# Patient Record
Sex: FEMALE | Race: ASIAN | NOT HISPANIC OR LATINO | Employment: FULL TIME | URBAN - METROPOLITAN AREA
[De-identification: names, ages, dates, MRNs, and addresses within clinical notes are randomized per-mention and may not be internally consistent; named-entity substitution may affect disease eponyms.]

---

## 2017-09-06 ENCOUNTER — TELEPHONE (OUTPATIENT)
Dept: OTOLARYNGOLOGY | Facility: CLINIC | Age: 27
End: 2017-09-06

## 2017-09-06 NOTE — TELEPHONE ENCOUNTER
Attempted call to pt advising unable to keep appt as our providers do not accept her insurance/age. Will attempt another call tomorrow.

## 2023-11-08 ENCOUNTER — OFFICE VISIT (OUTPATIENT)
Dept: DERMATOLOGY | Facility: CLINIC | Age: 33
End: 2023-11-08
Payer: COMMERCIAL

## 2023-11-08 ENCOUNTER — LAB VISIT (OUTPATIENT)
Dept: LAB | Facility: HOSPITAL | Age: 33
End: 2023-11-08
Attending: STUDENT IN AN ORGANIZED HEALTH CARE EDUCATION/TRAINING PROGRAM
Payer: COMMERCIAL

## 2023-11-08 VITALS — WEIGHT: 167.13 LBS | HEIGHT: 59 IN | BODY MASS INDEX: 33.69 KG/M2

## 2023-11-08 DIAGNOSIS — L40.9 PSORIASIS: Primary | ICD-10-CM

## 2023-11-08 DIAGNOSIS — L91.8 INFLAMED SKIN TAG: ICD-10-CM

## 2023-11-08 DIAGNOSIS — L40.9 PSORIASIS: ICD-10-CM

## 2023-11-08 LAB
BASOPHILS # BLD AUTO: 0.05 K/UL (ref 0–0.2)
BASOPHILS NFR BLD: 0.7 % (ref 0–1.9)
DIFFERENTIAL METHOD: NORMAL
EOSINOPHIL # BLD AUTO: 0 K/UL (ref 0–0.5)
EOSINOPHIL NFR BLD: 0.5 % (ref 0–8)
ERYTHROCYTE [DISTWIDTH] IN BLOOD BY AUTOMATED COUNT: 11.8 % (ref 11.5–14.5)
HCT VFR BLD AUTO: 45.5 % (ref 37–48.5)
HGB BLD-MCNC: 14.9 G/DL (ref 12–16)
IMM GRANULOCYTES # BLD AUTO: 0.04 K/UL (ref 0–0.04)
IMM GRANULOCYTES NFR BLD AUTO: 0.5 % (ref 0–0.5)
LYMPHOCYTES # BLD AUTO: 1.7 K/UL (ref 1–4.8)
LYMPHOCYTES NFR BLD: 22.1 % (ref 18–48)
MCH RBC QN AUTO: 29.5 PG (ref 27–31)
MCHC RBC AUTO-ENTMCNC: 32.7 G/DL (ref 32–36)
MCV RBC AUTO: 90 FL (ref 82–98)
MONOCYTES # BLD AUTO: 0.3 K/UL (ref 0.3–1)
MONOCYTES NFR BLD: 4.2 % (ref 4–15)
NEUTROPHILS # BLD AUTO: 5.4 K/UL (ref 1.8–7.7)
NEUTROPHILS NFR BLD: 72 % (ref 38–73)
NRBC BLD-RTO: 0 /100 WBC
PLATELET # BLD AUTO: 309 K/UL (ref 150–450)
PMV BLD AUTO: 10.7 FL (ref 9.2–12.9)
RBC # BLD AUTO: 5.05 M/UL (ref 4–5.4)
WBC # BLD AUTO: 7.55 K/UL (ref 3.9–12.7)

## 2023-11-08 PROCEDURE — 3008F BODY MASS INDEX DOCD: CPT | Mod: CPTII,S$GLB,, | Performed by: STUDENT IN AN ORGANIZED HEALTH CARE EDUCATION/TRAINING PROGRAM

## 2023-11-08 PROCEDURE — 17110 DESTRUCTION B9 LES UP TO 14: CPT | Mod: S$GLB,,, | Performed by: STUDENT IN AN ORGANIZED HEALTH CARE EDUCATION/TRAINING PROGRAM

## 2023-11-08 PROCEDURE — 1159F MED LIST DOCD IN RCRD: CPT | Mod: CPTII,S$GLB,, | Performed by: STUDENT IN AN ORGANIZED HEALTH CARE EDUCATION/TRAINING PROGRAM

## 2023-11-08 PROCEDURE — 99999 PR PBB SHADOW E&M-EST. PATIENT-LVL III: ICD-10-PCS | Mod: PBBFAC,,, | Performed by: STUDENT IN AN ORGANIZED HEALTH CARE EDUCATION/TRAINING PROGRAM

## 2023-11-08 PROCEDURE — 86480 TB TEST CELL IMMUN MEASURE: CPT | Performed by: STUDENT IN AN ORGANIZED HEALTH CARE EDUCATION/TRAINING PROGRAM

## 2023-11-08 PROCEDURE — 1160F RVW MEDS BY RX/DR IN RCRD: CPT | Mod: CPTII,S$GLB,, | Performed by: STUDENT IN AN ORGANIZED HEALTH CARE EDUCATION/TRAINING PROGRAM

## 2023-11-08 PROCEDURE — 36415 COLL VENOUS BLD VENIPUNCTURE: CPT | Performed by: STUDENT IN AN ORGANIZED HEALTH CARE EDUCATION/TRAINING PROGRAM

## 2023-11-08 PROCEDURE — 17110 PR DESTRUCTION BENIGN LESIONS UP TO 14: ICD-10-PCS | Mod: S$GLB,,, | Performed by: STUDENT IN AN ORGANIZED HEALTH CARE EDUCATION/TRAINING PROGRAM

## 2023-11-08 PROCEDURE — 1159F PR MEDICATION LIST DOCUMENTED IN MEDICAL RECORD: ICD-10-PCS | Mod: CPTII,S$GLB,, | Performed by: STUDENT IN AN ORGANIZED HEALTH CARE EDUCATION/TRAINING PROGRAM

## 2023-11-08 PROCEDURE — 85025 COMPLETE CBC W/AUTO DIFF WBC: CPT | Performed by: STUDENT IN AN ORGANIZED HEALTH CARE EDUCATION/TRAINING PROGRAM

## 2023-11-08 PROCEDURE — 99204 OFFICE O/P NEW MOD 45 MIN: CPT | Mod: 25,S$GLB,, | Performed by: STUDENT IN AN ORGANIZED HEALTH CARE EDUCATION/TRAINING PROGRAM

## 2023-11-08 PROCEDURE — 80074 ACUTE HEPATITIS PANEL: CPT | Performed by: STUDENT IN AN ORGANIZED HEALTH CARE EDUCATION/TRAINING PROGRAM

## 2023-11-08 PROCEDURE — 99204 PR OFFICE/OUTPT VISIT, NEW, LEVL IV, 45-59 MIN: ICD-10-PCS | Mod: 25,S$GLB,, | Performed by: STUDENT IN AN ORGANIZED HEALTH CARE EDUCATION/TRAINING PROGRAM

## 2023-11-08 PROCEDURE — 80053 COMPREHEN METABOLIC PANEL: CPT | Performed by: STUDENT IN AN ORGANIZED HEALTH CARE EDUCATION/TRAINING PROGRAM

## 2023-11-08 PROCEDURE — 3008F PR BODY MASS INDEX (BMI) DOCUMENTED: ICD-10-PCS | Mod: CPTII,S$GLB,, | Performed by: STUDENT IN AN ORGANIZED HEALTH CARE EDUCATION/TRAINING PROGRAM

## 2023-11-08 PROCEDURE — 1160F PR REVIEW ALL MEDS BY PRESCRIBER/CLIN PHARMACIST DOCUMENTED: ICD-10-PCS | Mod: CPTII,S$GLB,, | Performed by: STUDENT IN AN ORGANIZED HEALTH CARE EDUCATION/TRAINING PROGRAM

## 2023-11-08 PROCEDURE — 99999 PR PBB SHADOW E&M-EST. PATIENT-LVL III: CPT | Mod: PBBFAC,,, | Performed by: STUDENT IN AN ORGANIZED HEALTH CARE EDUCATION/TRAINING PROGRAM

## 2023-11-08 RX ORDER — RISANKIZUMAB-RZAA 150 MG/ML
INJECTION SUBCUTANEOUS
Qty: 1 ML | Refills: 6 | Status: ACTIVE | OUTPATIENT
Start: 2023-11-08 | End: 2024-01-11

## 2023-11-08 RX ORDER — CLOBETASOL PROPIONATE 0.46 MG/ML
SOLUTION TOPICAL 2 TIMES DAILY
Qty: 50 ML | Refills: 3 | Status: SHIPPED | OUTPATIENT
Start: 2023-11-08 | End: 2024-02-15

## 2023-11-08 RX ORDER — MOMETASONE FUROATE 1 MG/G
OINTMENT TOPICAL DAILY
Qty: 45 G | Refills: 3 | Status: SHIPPED | OUTPATIENT
Start: 2023-11-08

## 2023-11-08 NOTE — PROGRESS NOTES
Subjective:       Patient ID:  Santiago Landaverde is a 33 y.o. female who presents for   Chief Complaint   Patient presents with    Dry Skin     Pt visit skin rash  and tags to be removed      History of Present Illness: The patient presents with chief complaint of a persistent and worsening rash.  Location: throughout the scalp, some on the face and now developing on the trunk and extremities.   Duration: ongoing for years  Signs/Symptoms: thick, red plaques with thick white flaking, itching and irritation  Prior treatments: has been using Tgel shampoo and topical steroids with a little improvement, but symptoms are persistent.     Patient with new complaint of lesion(s) - painful skin tags  Location: 2 located on the neck  Duration: several months, recently started to become painful  Symptoms: hanging tags rubbing against clothing  Relieving factors/Previous treatments: none          Dry Skin        Review of Systems   Skin:  Positive for dry skin.        Objective:    Physical Exam   Constitutional: She appears well-developed and well-nourished. No distress.   Neurological: She is alert and oriented to person, place, and time. She is not disoriented.   Psychiatric: She has a normal mood and affect.   Skin:   Areas Examined (abnormalities noted in diagram):   Scalp / Hair Palpated and Inspected  Head / Face Inspection Performed  Neck Inspection Performed  Chest / Axilla Inspection Performed  Abdomen Inspection Performed  Back Inspection Performed  RUE Inspected  LUE Inspection Performed  RLE Inspected  LLE Inspection Performed                   Diagram Legend     Erythematous scaling macule/papule c/w actinic keratosis       Vascular papule c/w angioma      Pigmented verrucoid papule/plaque c/w seborrheic keratosis      Yellow umbilicated papule c/w sebaceous hyperplasia      Irregularly shaped tan macule c/w lentigo     1-2 mm smooth white papules consistent with Milia      Movable subcutaneous cyst with punctum  c/w epidermal inclusion cyst      Subcutaneous movable cyst c/w pilar cyst      Firm pink to brown papule c/w dermatofibroma      Pedunculated fleshy papule(s) c/w skin tag(s)      Evenly pigmented macule c/w junctional nevus     Mildly variegated pigmented, slightly irregular-bordered macule c/w mildly atypical nevus      Flesh colored to evenly pigmented papule c/w intradermal nevus       Pink pearly papule/plaque c/w basal cell carcinoma      Erythematous hyperkeratotic cursted plaque c/w SCC      Surgical scar with no sign of skin cancer recurrence      Open and closed comedones      Inflammatory papules and pustules      Verrucoid papule consistent consistent with wart     Erythematous eczematous patches and plaques     Dystrophic onycholytic nail with subungual debris c/w onychomycosis     Umbilicated papule    Erythematous-base heme-crusted tan verrucoid plaque consistent with inflamed seborrheic keratosis     Erythematous Silvery Scaling Plaque c/w Psoriasis     See annotation      Assessment / Plan:          Psoriasis - BSA >15%, including involvement on the scalp. Would be a good candidate for biologic treatment. After discussion with patient, will proceed with Skyrizi. Labs today.   -     Comprehensive Metabolic Panel; Future; Expected date: 11/08/2023  -     CBC Auto Differential; Future; Expected date: 11/08/2023  -     HEPATITIS PANEL, ACUTE; Future; Expected date: 11/08/2023  -     Quantiferon Gold TB; Future; Expected date: 11/08/2023  -     clobetasoL (TEMOVATE) 0.05 % external solution; Apply topically 2 (two) times daily.  Dispense: 50 mL; Refill: 3  -     mometasone (ELOCON) 0.1 % ointment; Apply topically once daily.  Dispense: 45 g; Refill: 3    Inflamed skin tag  Cryosurgery procedure note:    Verbal consent from the patient is obtained including, but not limited to, risk of hypopigmentation/hyperpigmentation, scar, recurrence of lesion. Liquid nitrogen cryosurgery is applied to 2 lesions to  produce a freeze injury. The patient is aware that blisters may form and is instructed on wound care with gentle cleansing and use of vaseline ointment to keep moist until healed. The patient is supplied a handout on cryosurgery and is instructed to call if lesions do not completely resolve.           Follow up in about 6 months (around 5/8/2024).

## 2023-11-09 PROBLEM — L40.9 PSORIASIS: Status: ACTIVE | Noted: 2023-11-08

## 2023-11-09 LAB
ALBUMIN SERPL BCP-MCNC: 4.7 G/DL (ref 3.5–5.2)
ALP SERPL-CCNC: 90 U/L (ref 55–135)
ALT SERPL W/O P-5'-P-CCNC: 131 U/L (ref 10–44)
ANION GAP SERPL CALC-SCNC: 10 MMOL/L (ref 8–16)
AST SERPL-CCNC: 71 U/L (ref 10–40)
BILIRUB SERPL-MCNC: 0.5 MG/DL (ref 0.1–1)
BUN SERPL-MCNC: 10 MG/DL (ref 6–20)
CALCIUM SERPL-MCNC: 10.5 MG/DL (ref 8.7–10.5)
CHLORIDE SERPL-SCNC: 101 MMOL/L (ref 95–110)
CO2 SERPL-SCNC: 27 MMOL/L (ref 23–29)
CREAT SERPL-MCNC: 1 MG/DL (ref 0.5–1.4)
EST. GFR  (NO RACE VARIABLE): >60 ML/MIN/1.73 M^2
GLUCOSE SERPL-MCNC: 156 MG/DL (ref 70–110)
HAV IGM SERPL QL IA: NORMAL
HBV CORE IGM SERPL QL IA: NORMAL
HBV SURFACE AG SERPL QL IA: NORMAL
HCV AB SERPL QL IA: NORMAL
POTASSIUM SERPL-SCNC: 4.3 MMOL/L (ref 3.5–5.1)
PROT SERPL-MCNC: 9 G/DL (ref 6–8.4)
SODIUM SERPL-SCNC: 138 MMOL/L (ref 136–145)

## 2023-11-10 DIAGNOSIS — R79.89 ELEVATED LFTS: Primary | ICD-10-CM

## 2023-11-10 LAB
GAMMA INTERFERON BACKGROUND BLD IA-ACNC: 0.35 IU/ML
M TB IFN-G CD4+ BCKGRND COR BLD-ACNC: 0.02 IU/ML
M TB IFN-G CD4+ BCKGRND COR BLD-ACNC: 0.04 IU/ML
MITOGEN IGNF BCKGRD COR BLD-ACNC: 4.53 IU/ML
TB GOLD PLUS: NEGATIVE

## 2023-11-14 ENCOUNTER — TELEPHONE (OUTPATIENT)
Dept: DERMATOLOGY | Facility: CLINIC | Age: 33
End: 2023-11-14
Payer: COMMERCIAL

## 2023-11-14 NOTE — TELEPHONE ENCOUNTER
Called and notified the patient of her results and that referral was sent. Pt given number to check on the status of medication at [facility].  Looks like medication was approved. Pt notified I would check with provider to see if she needed to wait until she saw gi to start treatment. Pt verbalized understanding   ----- Message from Adiel Laughlin MD sent at 11/10/2023 11:01 AM CST -----  Please let the patient know I sent over a referral to our gastroenterology department to help give us manage her elevated liver enzymes before and while on treatment for her psoriasis. Referral was placed. All other labs were normal and TB test was negative.

## 2023-11-21 NOTE — PROGRESS NOTES
Yeah, her levels are very elevated, and want to get their okay. I'm not sure if they've reached out to her not, but the referral was placed over a week ago.

## 2023-11-22 ENCOUNTER — PATIENT MESSAGE (OUTPATIENT)
Dept: DERMATOLOGY | Facility: CLINIC | Age: 33
End: 2023-11-22
Payer: COMMERCIAL

## 2023-11-22 ENCOUNTER — TELEPHONE (OUTPATIENT)
Dept: DERMATOLOGY | Facility: CLINIC | Age: 33
End: 2023-11-22
Payer: COMMERCIAL

## 2023-11-22 DIAGNOSIS — R79.89 ELEVATED LFTS: Primary | ICD-10-CM

## 2023-11-22 NOTE — TELEPHONE ENCOUNTER
Attempted to contact patient to follow up that we need clearance from gi to proceed with med. No answer. Message left. Pt was also sent message in portal.   ----- Message from Adiel Laughlin MD sent at 11/21/2023  9:46 AM CST -----  Yeah, her levels are very elevated, and want to get their okay. I'm not sure if they've reached out to her not, but the referral was placed over a week ago.

## 2023-11-29 ENCOUNTER — OFFICE VISIT (OUTPATIENT)
Dept: GASTROENTEROLOGY | Facility: CLINIC | Age: 33
End: 2023-11-29
Payer: COMMERCIAL

## 2023-11-29 ENCOUNTER — LAB VISIT (OUTPATIENT)
Dept: LAB | Facility: HOSPITAL | Age: 33
End: 2023-11-29
Attending: NURSE PRACTITIONER
Payer: COMMERCIAL

## 2023-11-29 VITALS
HEIGHT: 59 IN | SYSTOLIC BLOOD PRESSURE: 135 MMHG | BODY MASS INDEX: 33.52 KG/M2 | WEIGHT: 166.25 LBS | HEART RATE: 98 BPM | DIASTOLIC BLOOD PRESSURE: 91 MMHG

## 2023-11-29 DIAGNOSIS — K76.0 FATTY LIVER: Primary | ICD-10-CM

## 2023-11-29 DIAGNOSIS — R79.89 ELEVATED LFTS: ICD-10-CM

## 2023-11-29 LAB
BASOPHILS # BLD AUTO: 0.05 K/UL (ref 0–0.2)
BASOPHILS NFR BLD: 0.8 % (ref 0–1.9)
DIFFERENTIAL METHOD: NORMAL
EOSINOPHIL # BLD AUTO: 0.1 K/UL (ref 0–0.5)
EOSINOPHIL NFR BLD: 0.8 % (ref 0–8)
ERYTHROCYTE [DISTWIDTH] IN BLOOD BY AUTOMATED COUNT: 11.8 % (ref 11.5–14.5)
HCT VFR BLD AUTO: 43.5 % (ref 37–48.5)
HGB BLD-MCNC: 14.3 G/DL (ref 12–16)
IMM GRANULOCYTES # BLD AUTO: 0.03 K/UL (ref 0–0.04)
IMM GRANULOCYTES NFR BLD AUTO: 0.5 % (ref 0–0.5)
INR PPP: 1 (ref 0.8–1.2)
LYMPHOCYTES # BLD AUTO: 1.6 K/UL (ref 1–4.8)
LYMPHOCYTES NFR BLD: 26.2 % (ref 18–48)
MCH RBC QN AUTO: 29.6 PG (ref 27–31)
MCHC RBC AUTO-ENTMCNC: 32.9 G/DL (ref 32–36)
MCV RBC AUTO: 90 FL (ref 82–98)
MONOCYTES # BLD AUTO: 0.4 K/UL (ref 0.3–1)
MONOCYTES NFR BLD: 6.4 % (ref 4–15)
NEUTROPHILS # BLD AUTO: 4.1 K/UL (ref 1.8–7.7)
NEUTROPHILS NFR BLD: 65.3 % (ref 38–73)
NRBC BLD-RTO: 0 /100 WBC
PLATELET # BLD AUTO: 263 K/UL (ref 150–450)
PMV BLD AUTO: 10.5 FL (ref 9.2–12.9)
PROTHROMBIN TIME: 11.2 SEC (ref 9–12.5)
RBC # BLD AUTO: 4.83 M/UL (ref 4–5.4)
WBC # BLD AUTO: 6.21 K/UL (ref 3.9–12.7)

## 2023-11-29 PROCEDURE — 3080F DIAST BP >= 90 MM HG: CPT | Mod: CPTII,S$GLB,, | Performed by: NURSE PRACTITIONER

## 2023-11-29 PROCEDURE — 3080F PR MOST RECENT DIASTOLIC BLOOD PRESSURE >= 90 MM HG: ICD-10-PCS | Mod: CPTII,S$GLB,, | Performed by: NURSE PRACTITIONER

## 2023-11-29 PROCEDURE — 82103 ALPHA-1-ANTITRYPSIN TOTAL: CPT | Performed by: NURSE PRACTITIONER

## 2023-11-29 PROCEDURE — 99999 PR PBB SHADOW E&M-EST. PATIENT-LVL IV: ICD-10-PCS | Mod: PBBFAC,,, | Performed by: NURSE PRACTITIONER

## 2023-11-29 PROCEDURE — 84466 ASSAY OF TRANSFERRIN: CPT | Performed by: NURSE PRACTITIONER

## 2023-11-29 PROCEDURE — 83540 ASSAY OF IRON: CPT | Performed by: NURSE PRACTITIONER

## 2023-11-29 PROCEDURE — 82728 ASSAY OF FERRITIN: CPT | Performed by: NURSE PRACTITIONER

## 2023-11-29 PROCEDURE — 86039 ANTINUCLEAR ANTIBODIES (ANA): CPT | Performed by: NURSE PRACTITIONER

## 2023-11-29 PROCEDURE — 3075F SYST BP GE 130 - 139MM HG: CPT | Mod: CPTII,S$GLB,, | Performed by: NURSE PRACTITIONER

## 2023-11-29 PROCEDURE — 86235 NUCLEAR ANTIGEN ANTIBODY: CPT | Mod: 59 | Performed by: NURSE PRACTITIONER

## 2023-11-29 PROCEDURE — 82784 ASSAY IGA/IGD/IGG/IGM EACH: CPT | Mod: 59 | Performed by: NURSE PRACTITIONER

## 2023-11-29 PROCEDURE — 85610 PROTHROMBIN TIME: CPT | Performed by: NURSE PRACTITIONER

## 2023-11-29 PROCEDURE — 99204 OFFICE O/P NEW MOD 45 MIN: CPT | Mod: S$GLB,,, | Performed by: NURSE PRACTITIONER

## 2023-11-29 PROCEDURE — 85025 COMPLETE CBC W/AUTO DIFF WBC: CPT | Performed by: NURSE PRACTITIONER

## 2023-11-29 PROCEDURE — 3075F PR MOST RECENT SYSTOLIC BLOOD PRESS GE 130-139MM HG: ICD-10-PCS | Mod: CPTII,S$GLB,, | Performed by: NURSE PRACTITIONER

## 2023-11-29 PROCEDURE — 3008F PR BODY MASS INDEX (BMI) DOCUMENTED: ICD-10-PCS | Mod: CPTII,S$GLB,, | Performed by: NURSE PRACTITIONER

## 2023-11-29 PROCEDURE — 86704 HEP B CORE ANTIBODY TOTAL: CPT | Performed by: NURSE PRACTITIONER

## 2023-11-29 PROCEDURE — 86015 ACTIN ANTIBODY EACH: CPT | Performed by: NURSE PRACTITIONER

## 2023-11-29 PROCEDURE — 99204 PR OFFICE/OUTPT VISIT, NEW, LEVL IV, 45-59 MIN: ICD-10-PCS | Mod: S$GLB,,, | Performed by: NURSE PRACTITIONER

## 2023-11-29 PROCEDURE — 86790 VIRUS ANTIBODY NOS: CPT | Performed by: NURSE PRACTITIONER

## 2023-11-29 PROCEDURE — 80053 COMPREHEN METABOLIC PANEL: CPT | Performed by: NURSE PRACTITIONER

## 2023-11-29 PROCEDURE — 86038 ANTINUCLEAR ANTIBODIES: CPT | Performed by: NURSE PRACTITIONER

## 2023-11-29 PROCEDURE — 1160F PR REVIEW ALL MEDS BY PRESCRIBER/CLIN PHARMACIST DOCUMENTED: ICD-10-PCS | Mod: CPTII,S$GLB,, | Performed by: NURSE PRACTITIONER

## 2023-11-29 PROCEDURE — 36415 COLL VENOUS BLD VENIPUNCTURE: CPT | Performed by: NURSE PRACTITIONER

## 2023-11-29 PROCEDURE — 86364 TISS TRNSGLTMNASE EA IG CLAS: CPT | Performed by: NURSE PRACTITIONER

## 2023-11-29 PROCEDURE — 99999 PR PBB SHADOW E&M-EST. PATIENT-LVL IV: CPT | Mod: PBBFAC,,, | Performed by: NURSE PRACTITIONER

## 2023-11-29 PROCEDURE — 3008F BODY MASS INDEX DOCD: CPT | Mod: CPTII,S$GLB,, | Performed by: NURSE PRACTITIONER

## 2023-11-29 PROCEDURE — 1160F RVW MEDS BY RX/DR IN RCRD: CPT | Mod: CPTII,S$GLB,, | Performed by: NURSE PRACTITIONER

## 2023-11-29 PROCEDURE — 1159F MED LIST DOCD IN RCRD: CPT | Mod: CPTII,S$GLB,, | Performed by: NURSE PRACTITIONER

## 2023-11-29 PROCEDURE — 1159F PR MEDICATION LIST DOCUMENTED IN MEDICAL RECORD: ICD-10-PCS | Mod: CPTII,S$GLB,, | Performed by: NURSE PRACTITIONER

## 2023-11-29 PROCEDURE — 86381 MITOCHONDRIAL ANTIBODY EACH: CPT | Performed by: NURSE PRACTITIONER

## 2023-11-29 NOTE — PROGRESS NOTES
Clinic Consult:  [facility] Consultation Note    Reason for Consult:  The primary encounter diagnosis was Fatty liver. A diagnosis of Elevated LFTs was also pertinent to this visit.    PCP: Susie Hopson   [address] [facility] / [place] [id number]    HPI:  This is a 33 y.o. female here for evaluation of elevated liver enzymes.   She has psoriasis and is seeing dr. Laughlin who would like to start Skyrizi. She got baseline labs and noticed liver enzymes to be elevated.   Reviewed previous labs and was also elevated in February and May 2023.   She also had an ultrasound in 2017 that showed fatty liver.   Risk factors for fatty liver: DM, obesity, PSOS with metabolic syndrome.   She does not drink alcohol but did in 2020 and 2021. She would drink about 4-5 days out of the week and would drink about 2 shots per day.   She did start Januvia and Ozempic about 3 months ago no other recent medication changes. No family history of liver disease.     Her PCP did order an abdominal ultrasound. I will fax the result to her  Susie Mark Anthony  [id number]   [place] [address]  [phone]    Review of Systems   Constitutional:  Negative for fever and weight loss.   HENT:  Negative for sore throat.    Respiratory:  Negative for cough, shortness of breath and wheezing.    Cardiovascular:  Negative for chest pain and palpitations.   Gastrointestinal:  Negative for abdominal pain, blood in stool, constipation, diarrhea, heartburn, melena, nausea and vomiting.   Genitourinary:  Negative for dysuria and frequency.   Skin:  Negative for itching and rash.       Medical History:  has a past medical history of Hyperlipidemia and Insulin resistance.    Surgical History:  has no past surgical history on file.    Family History: family history is not on file..     Social History:  reports that she has never smoked. She has never used smokeless tobacco. She reports that she does not drink  "alcohol and does not use drugs.    Allergies: Reviewed    Home Medications:   Current Outpatient Medications on File Prior to Visit   Medication Sig Dispense Refill    clobetasoL (TEMOVATE) 0.05 % external solution Apply topically 2 (two) times daily. 50 mL 3    JANUVIA 50 mg Tab Take 50 mg by mouth every morning.      medroxyPROGESTERone (PROVERA) 10 MG tablet Take 1 tablet (10 mg total) by mouth once daily. Take 1 pill po daily x 10 days each month for 3 months. for 10 days 10 tablet 2    mometasone (ELOCON) 0.1 % ointment Apply topically once daily. 45 g 3    OZEMPIC 0.25 mg or 0.5 mg (2 mg/3 mL) pen injector INJECT 0.25 MG SUBCUTANEOUSLY EVERY WEEK      VYVANSE 10 mg Cap take 1 capsule by mouth every morning maximum daily dose of 1 capsule      risankizumab-rzaa (SKYRIZI) 150 mg/mL PnIj Inject 150 mg at weeks 0, 4, and then every 12 weeks thereafter. (Patient not taking: Reported on 11/29/2023) 1 mL 6     No current facility-administered medications on file prior to visit.       Physical Exam:  BP (!) 135/91 (BP Location: Left arm, Patient Position: Sitting, BP Method: Medium (Automatic))   Pulse 98   Ht 4' 11" (1.499 m)   Wt 75.4 kg (166 lb 3.6 oz)   BMI 33.57 kg/m²   Body mass index is 33.57 kg/m².  Physical Exam  Constitutional:       General: She is not in acute distress.  Cardiovascular:      Rate and Rhythm: Normal rate and regular rhythm.      Heart sounds: Normal heart sounds. No murmur heard.  Pulmonary:      Effort: Pulmonary effort is normal. No respiratory distress.      Breath sounds: Normal breath sounds.   Neurological:      Mental Status: She is alert.   Psychiatric:         Mood and Affect: Mood normal.         Behavior: Behavior normal.         Labs: Pertinent labs reviewed.  CRC Screening: NA    Assessment:  1. Fatty liver    2. Elevated LFTs    We discussed this is likely fatty liver disease. This would not be contraindication for Skyrizi. However, will get full workup piror to giving " clearance to start.     Recommendations:   - ultrasound  - labs below.   - fibroscan     Fatty liver  -     FibroScan (Vibration Controlled Transient Elastography); Future    Elevated LFTs  -     Ambulatory referral/consult to Gastroenterology  -     US Abdomen Limited; Future; Expected date: 11/29/2023  -     Alpha-1-Antitrypsin; Future; Expected date: 11/29/2023  -     BRAXTON Screen w/Reflex; Future; Expected date: 11/29/2023  -     Antimitochondrial Antibody; Future; Expected date: 11/29/2023  -     Anti-Smooth Muscle Antibody; Future; Expected date: 11/29/2023  -     CBC Auto Differential; Future; Expected date: 11/29/2023  -     Comprehensive Metabolic Panel; Future; Expected date: 11/29/2023  -     Ferritin; Future; Expected date: 11/29/2023  -     Hepatitis A antibody, IgG; Future; Expected date: 11/29/2023  -     Iron and TIBC; Future; Expected date: 11/29/2023  -     Protime-INR; Future; Expected date: 11/29/2023  -     Tissue Transglutaminase, IgA; Future; Expected date: 11/29/2023  -     Immunoglobulins (IgG, IgA, IgM) Quantitative; Future; Expected date: 11/29/2023  -     Hepatitis B Core Antibody, Total; Future; Expected date: 11/29/2023  -     FibroScan (Vibration Controlled Transient Elastography); Future    Follow up to be determined after results/ procedure(s).    Thank you so much for allowing me to participate in the care of Santiago FloresEVAN Martinez

## 2023-11-30 ENCOUNTER — PATIENT MESSAGE (OUTPATIENT)
Dept: GASTROENTEROLOGY | Facility: CLINIC | Age: 33
End: 2023-11-30
Payer: COMMERCIAL

## 2023-11-30 LAB
A1AT SERPL-MCNC: 109 MG/DL (ref 100–190)
ALBUMIN SERPL BCP-MCNC: 4.2 G/DL (ref 3.5–5.2)
ALP SERPL-CCNC: 82 U/L (ref 55–135)
ALT SERPL W/O P-5'-P-CCNC: 101 U/L (ref 10–44)
ANA PATTERN 1: NORMAL
ANA SER QL IF: POSITIVE
ANA TITR SER IF: NORMAL {TITER}
ANION GAP SERPL CALC-SCNC: 14 MMOL/L (ref 8–16)
AST SERPL-CCNC: 47 U/L (ref 10–40)
BILIRUB SERPL-MCNC: 0.4 MG/DL (ref 0.1–1)
BUN SERPL-MCNC: 13 MG/DL (ref 6–20)
CALCIUM SERPL-MCNC: 9.7 MG/DL (ref 8.7–10.5)
CHLORIDE SERPL-SCNC: 100 MMOL/L (ref 95–110)
CO2 SERPL-SCNC: 23 MMOL/L (ref 23–29)
CREAT SERPL-MCNC: 1.1 MG/DL (ref 0.5–1.4)
EST. GFR  (NO RACE VARIABLE): >60 ML/MIN/1.73 M^2
FERRITIN SERPL-MCNC: 315 NG/ML (ref 20–300)
GLUCOSE SERPL-MCNC: 302 MG/DL (ref 70–110)
HAV IGG SER QL IA: NORMAL
HBV CORE AB SERPL QL IA: NORMAL
IGA SERPL-MCNC: 600 MG/DL (ref 40–350)
IGG SERPL-MCNC: 1383 MG/DL (ref 650–1600)
IGM SERPL-MCNC: 107 MG/DL (ref 50–300)
IRON SERPL-MCNC: 69 UG/DL (ref 30–160)
POTASSIUM SERPL-SCNC: 4.5 MMOL/L (ref 3.5–5.1)
PROT SERPL-MCNC: 8.2 G/DL (ref 6–8.4)
SATURATED IRON: 16 % (ref 20–50)
SODIUM SERPL-SCNC: 137 MMOL/L (ref 136–145)
TOTAL IRON BINDING CAPACITY: 444 UG/DL (ref 250–450)
TRANSFERRIN SERPL-MCNC: 300 MG/DL (ref 200–375)

## 2023-12-01 ENCOUNTER — PATIENT MESSAGE (OUTPATIENT)
Dept: GASTROENTEROLOGY | Facility: CLINIC | Age: 33
End: 2023-12-01
Payer: COMMERCIAL

## 2023-12-01 LAB
MITOCHONDRIA AB TITR SER IF: NORMAL {TITER}
SMOOTH MUSCLE AB TITR SER IF: NORMAL {TITER}

## 2023-12-04 LAB — TTG IGA SER-ACNC: 1.5 U/ML

## 2023-12-07 LAB
ANTI SM ANTIBODY: 0.14 RATIO (ref 0–0.99)
ANTI SM/RNP ANTIBODY: 0.52 RATIO (ref 0–0.99)
ANTI-SM INTERPRETATION: NEGATIVE
ANTI-SM/RNP INTERPRETATION: NEGATIVE
ANTI-SSA ANTIBODY: 0.07 RATIO (ref 0–0.99)
ANTI-SSA INTERPRETATION: NEGATIVE
ANTI-SSB ANTIBODY: 0.09 RATIO (ref 0–0.99)
ANTI-SSB INTERPRETATION: NEGATIVE
DSDNA AB SER-ACNC: NORMAL [IU]/ML

## 2023-12-19 ENCOUNTER — HOSPITAL ENCOUNTER (OUTPATIENT)
Dept: RADIOLOGY | Facility: HOSPITAL | Age: 33
Discharge: HOME | End: 2023-12-19
Attending: NURSE PRACTITIONER
Payer: COMMERCIAL

## 2023-12-19 ENCOUNTER — PROCEDURE VISIT (OUTPATIENT)
Dept: HEPATOLOGY | Facility: CLINIC | Age: 33
End: 2023-12-19
Payer: COMMERCIAL

## 2023-12-19 VITALS — HEIGHT: 59 IN | WEIGHT: 165.38 LBS | BODY MASS INDEX: 33.34 KG/M2

## 2023-12-19 DIAGNOSIS — R79.89 ELEVATED LFTS: ICD-10-CM

## 2023-12-19 DIAGNOSIS — K76.0 FATTY LIVER: ICD-10-CM

## 2023-12-19 PROCEDURE — 76705 US ABDOMEN LIMITED: ICD-10-PCS | Mod: 26,,, | Performed by: RADIOLOGY

## 2023-12-19 PROCEDURE — 76705 ECHO EXAM OF ABDOMEN: CPT | Mod: TC

## 2023-12-19 PROCEDURE — 76981 USE PARENCHYMA: CPT | Mod: S$GLB,,, | Performed by: NURSE PRACTITIONER

## 2023-12-19 PROCEDURE — 76705 ECHO EXAM OF ABDOMEN: CPT | Mod: 26,,, | Performed by: RADIOLOGY

## 2023-12-19 PROCEDURE — 76981 FIBROSCAN (VIBRATION CONTROLLED TRANSIENT ELASTOGRAPHY): ICD-10-PCS | Mod: S$GLB,,, | Performed by: NURSE PRACTITIONER

## 2023-12-19 NOTE — PROCEDURES
FibroScan (Vibration Controlled Transient Elastography)    Date/Time: 12/19/2023 2:00 PM    Performed by: Ольга Bliss RN  Authorized by: Doris Lam NP    Probe:     Universal Protocol: Patient's identity, procedure and site were verified, confirmatory pause was performed.  Discussed procedure including risks and potential complications.  Questions answered.  Patient verbalizes understanding and wishes to proceed with VCTE.     Procedure: After providing explanations of the procedure, patient was placed in the supine position with right arm in maximum abduction to allow optimal exposure of right lateral abdomen.  Patient was briefly assessed, Testing was performed in the mid-axillary location, 50Hz Shear Wave pulses were applied and the resulting Shear Wave and Propagation Speed detected with a 3.5 MHz ultrasonic signal, using the FibroScan probe, Skin to liver capsule distance and liver parenchyma were accessed during the entire examination with the FibroScan probe, Patient was instructed to breathe normally and to abstain from sudden movements during the procedure, allowing for random measurements of liver stiffness. At least 10 Shear Waves were produced, Individual measurements of each Shear Wave were calculated.  Patient tolerated the procedure well with no complications.  Meets discharge criteria as was dismissed.  Rates pain 0 out of 10.  Patient will follow up with ordering provider to review results.

## 2023-12-20 ENCOUNTER — PATIENT MESSAGE (OUTPATIENT)
Dept: HEPATOLOGY | Facility: CLINIC | Age: 33
End: 2023-12-20
Payer: COMMERCIAL

## 2023-12-20 ENCOUNTER — TELEPHONE (OUTPATIENT)
Dept: HEPATOLOGY | Facility: CLINIC | Age: 33
End: 2023-12-20
Payer: COMMERCIAL

## 2023-12-20 NOTE — PROCEDURES
Fibroscan Procedure     Name: Santiago Landaverde  Date of Procedure : 2023   :: TAMARA Daniel  Diagnosis: NAFLD    Probe: M    Fibroscan readin.2 KPa    Fibrosis:F3     CAP readin dB/m    Steatosis: :S3       Interpretation:   Severe steatosis with advanced fibrosis

## 2023-12-20 NOTE — TELEPHONE ENCOUNTER
----- Message from Doris Lam NP sent at 12/20/2023  7:34 AM CST -----  Please contact patient to schedule consultation with hepatology. I would like her to be seen within the next couple of months if possible. Reason is liver fibrosis, elevated liver enzymes, positive BRAXTON.

## 2024-01-04 ENCOUNTER — PATIENT MESSAGE (OUTPATIENT)
Dept: DERMATOLOGY | Facility: CLINIC | Age: 34
End: 2024-01-04
Payer: COMMERCIAL

## 2024-01-05 NOTE — TELEPHONE ENCOUNTER
I will double check for her. Based on her liver scan results, want to make sure things are okay to start

## 2024-01-09 ENCOUNTER — PATIENT MESSAGE (OUTPATIENT)
Dept: DERMATOLOGY | Facility: CLINIC | Age: 34
End: 2024-01-09
Payer: COMMERCIAL

## 2024-01-10 NOTE — PROGRESS NOTES
Subjective     Patient ID: Santiago Landaverde is a 33 y.o. female.    Chief Complaint: Establish Care      HPI  Patient presents to establish care. She reports she was recently diagnosed as diabetic but does not remember her A1c. She has been taking Januvia and Ozempic    Review of Systems   Constitutional:  Negative for chills and fatigue.   Respiratory:  Negative for chest tightness and shortness of breath.    Cardiovascular:  Negative for chest pain and palpitations.   Gastrointestinal:  Negative for abdominal pain, constipation, diarrhea, nausea and vomiting.   Genitourinary:  Negative for frequency and urgency.   Neurological:  Negative for dizziness, numbness and headaches.          Objective       Current Outpatient Medications:     clobetasoL (TEMOVATE) 0.05 % external solution, Apply topically 2 (two) times daily., Disp: 50 mL, Rfl: 3    medroxyPROGESTERone (PROVERA) 10 MG tablet, Take 1 tablet (10 mg total) by mouth once daily. Take 1 pill po daily x 10 days each month for 3 months. for 10 days, Disp: 10 tablet, Rfl: 2    mometasone (ELOCON) 0.1 % ointment, Apply topically once daily., Disp: 45 g, Rfl: 3    OZEMPIC 0.25 mg or 0.5 mg (2 mg/3 mL) pen injector, INJECT 0.25 MG SUBCUTANEOUSLY EVERY WEEK, Disp: , Rfl:     VYVANSE 10 mg Cap, take 1 capsule by mouth every morning maximum daily dose of 1 capsule, Disp: , Rfl:     JANUVIA 50 mg Tab, Take 1 tablet (50 mg total) by mouth every morning., Disp: 90 tablet, Rfl: 3     Physical Exam  Constitutional:       General: She is not in acute distress.     Appearance: Normal appearance. She is obese.   HENT:      Head: Normocephalic and atraumatic.   Cardiovascular:      Rate and Rhythm: Normal rate and regular rhythm.      Heart sounds: Normal heart sounds. No murmur heard.     No friction rub. No gallop.   Pulmonary:      Effort: Pulmonary effort is normal.      Breath sounds: Normal breath sounds. No wheezing, rhonchi or rales.   Abdominal:      General: Bowel  sounds are normal. There is no distension.      Palpations: Abdomen is soft.      Tenderness: There is no abdominal tenderness. There is no rebound.   Skin:     General: Skin is warm and dry.      Coloration: Skin is not jaundiced.   Neurological:      General: No focal deficit present.      Mental Status: She is alert and oriented to person, place, and time. Mental status is at baseline.   Psychiatric:         Mood and Affect: Mood normal.         Behavior: Behavior normal.            Assessment and Plan     1. Prediabetes  -     Hemoglobin A1C; Future; Expected date: 01/11/2024    2. Type 2 diabetes mellitus without complication, without long-term current use of insulin  -     JANUVIA 50 mg Tab; Take 1 tablet (50 mg total) by mouth every morning.  Dispense: 90 tablet; Refill: 3        Will obtain A1c.  Will refill Januvia today. Will refill/adjust Ozempic after A1c results       Follow up in about 3 months (around 4/11/2024) for Diabetes follow up.

## 2024-01-11 ENCOUNTER — OFFICE VISIT (OUTPATIENT)
Dept: FAMILY MEDICINE | Facility: CLINIC | Age: 34
End: 2024-01-11
Payer: COMMERCIAL

## 2024-01-11 ENCOUNTER — LAB VISIT (OUTPATIENT)
Dept: LAB | Facility: HOSPITAL | Age: 34
End: 2024-01-11
Attending: INTERNAL MEDICINE
Payer: COMMERCIAL

## 2024-01-11 VITALS
BODY MASS INDEX: 32.77 KG/M2 | OXYGEN SATURATION: 98 % | DIASTOLIC BLOOD PRESSURE: 84 MMHG | WEIGHT: 162.25 LBS | HEART RATE: 94 BPM | RESPIRATION RATE: 18 BRPM | TEMPERATURE: 98 F | SYSTOLIC BLOOD PRESSURE: 126 MMHG

## 2024-01-11 DIAGNOSIS — E11.9 TYPE 2 DIABETES MELLITUS WITHOUT COMPLICATION, WITHOUT LONG-TERM CURRENT USE OF INSULIN: ICD-10-CM

## 2024-01-11 DIAGNOSIS — R73.03 PREDIABETES: Primary | ICD-10-CM

## 2024-01-11 DIAGNOSIS — R73.03 PREDIABETES: ICD-10-CM

## 2024-01-11 PROCEDURE — 3008F BODY MASS INDEX DOCD: CPT | Mod: CPTII,S$GLB,, | Performed by: INTERNAL MEDICINE

## 2024-01-11 PROCEDURE — 99999 PR PBB SHADOW E&M-EST. PATIENT-LVL III: CPT | Mod: PBBFAC,,, | Performed by: INTERNAL MEDICINE

## 2024-01-11 PROCEDURE — 36415 COLL VENOUS BLD VENIPUNCTURE: CPT | Mod: PO | Performed by: INTERNAL MEDICINE

## 2024-01-11 PROCEDURE — 83036 HEMOGLOBIN GLYCOSYLATED A1C: CPT | Performed by: INTERNAL MEDICINE

## 2024-01-11 PROCEDURE — 3079F DIAST BP 80-89 MM HG: CPT | Mod: CPTII,S$GLB,, | Performed by: INTERNAL MEDICINE

## 2024-01-11 PROCEDURE — 99203 OFFICE O/P NEW LOW 30 MIN: CPT | Mod: S$GLB,,, | Performed by: INTERNAL MEDICINE

## 2024-01-11 PROCEDURE — 3074F SYST BP LT 130 MM HG: CPT | Mod: CPTII,S$GLB,, | Performed by: INTERNAL MEDICINE

## 2024-01-11 PROCEDURE — 1159F MED LIST DOCD IN RCRD: CPT | Mod: CPTII,S$GLB,, | Performed by: INTERNAL MEDICINE

## 2024-01-11 RX ORDER — SITAGLIPTIN 50 MG/1
50 TABLET, FILM COATED ORAL EVERY MORNING
Qty: 90 TABLET | Refills: 3 | Status: SHIPPED | OUTPATIENT
Start: 2024-01-11

## 2024-01-12 ENCOUNTER — PATIENT MESSAGE (OUTPATIENT)
Dept: FAMILY MEDICINE | Facility: CLINIC | Age: 34
End: 2024-01-12
Payer: COMMERCIAL

## 2024-01-12 LAB
ESTIMATED AVG GLUCOSE: 134 MG/DL (ref 68–131)
HBA1C MFR BLD: 6.3 % (ref 4–5.6)

## 2024-01-12 RX ORDER — SEMAGLUTIDE 0.68 MG/ML
INJECTION, SOLUTION SUBCUTANEOUS
Qty: 1 EACH | Refills: 11 | Status: SHIPPED | OUTPATIENT
Start: 2024-01-12

## 2024-01-21 ENCOUNTER — PATIENT MESSAGE (OUTPATIENT)
Dept: DERMATOLOGY | Facility: CLINIC | Age: 34
End: 2024-01-21
Payer: COMMERCIAL

## 2024-01-21 DIAGNOSIS — L40.9 PSORIASIS: Primary | ICD-10-CM

## 2024-01-23 NOTE — TELEPHONE ENCOUNTER
Thanks for the update. I see she has an appointment for 3/8/24. She should keep that for her follow-up.

## 2024-02-13 ENCOUNTER — PATIENT MESSAGE (OUTPATIENT)
Dept: FAMILY MEDICINE | Facility: CLINIC | Age: 34
End: 2024-02-13
Payer: COMMERCIAL

## 2024-02-14 ENCOUNTER — TELEPHONE (OUTPATIENT)
Dept: FAMILY MEDICINE | Facility: CLINIC | Age: 34
End: 2024-02-14
Payer: COMMERCIAL

## 2024-02-14 RX ORDER — RISANKIZUMAB-RZAA 150 MG/ML
INJECTION SUBCUTANEOUS
Qty: 1 ML | Refills: 3 | Status: SHIPPED | OUTPATIENT
Start: 2024-02-14

## 2024-02-14 NOTE — TELEPHONE ENCOUNTER
Pt last  Dx her with type 2 diabetes and was on Ozempic.  Now her Ozempic is being denied because we have her dx as prediabetic.  Pt want the Ozempic and the dx changed to type 2 diabetes.

## 2024-02-14 NOTE — TELEPHONE ENCOUNTER
----- Message from Gerri Coker sent at 2/14/2024 10:36 AM CST -----  Contact: Nancy Sloan is calling in regards to the Ozempic.please call back at .[phone]           Thanks  AMADOU

## 2024-02-15 ENCOUNTER — TELEPHONE (OUTPATIENT)
Dept: FAMILY MEDICINE | Facility: CLINIC | Age: 34
End: 2024-02-15
Payer: COMMERCIAL

## 2024-02-15 ENCOUNTER — OFFICE VISIT (OUTPATIENT)
Dept: HEPATOLOGY | Facility: CLINIC | Age: 34
End: 2024-02-15
Payer: COMMERCIAL

## 2024-02-15 ENCOUNTER — PATIENT MESSAGE (OUTPATIENT)
Dept: FAMILY MEDICINE | Facility: CLINIC | Age: 34
End: 2024-02-15
Payer: COMMERCIAL

## 2024-02-15 VITALS — BODY MASS INDEX: 32.25 KG/M2 | HEIGHT: 59 IN | WEIGHT: 160 LBS

## 2024-02-15 DIAGNOSIS — R79.89 ELEVATED LFTS: ICD-10-CM

## 2024-02-15 DIAGNOSIS — K74.60 CIRRHOSIS OF LIVER WITHOUT ASCITES, UNSPECIFIED HEPATIC CIRRHOSIS TYPE: Primary | ICD-10-CM

## 2024-02-15 DIAGNOSIS — K75.81 NASH (NONALCOHOLIC STEATOHEPATITIS): Primary | ICD-10-CM

## 2024-02-15 DIAGNOSIS — R73.03 PREDIABETES: ICD-10-CM

## 2024-02-15 PROCEDURE — 1160F RVW MEDS BY RX/DR IN RCRD: CPT | Mod: CPTII,95,, | Performed by: INTERNAL MEDICINE

## 2024-02-15 PROCEDURE — 99213 OFFICE O/P EST LOW 20 MIN: CPT | Mod: 95,,, | Performed by: INTERNAL MEDICINE

## 2024-02-15 PROCEDURE — 1159F MED LIST DOCD IN RCRD: CPT | Mod: CPTII,95,, | Performed by: INTERNAL MEDICINE

## 2024-02-15 PROCEDURE — 3008F BODY MASS INDEX DOCD: CPT | Mod: CPTII,95,, | Performed by: INTERNAL MEDICINE

## 2024-02-15 PROCEDURE — 3044F HG A1C LEVEL LT 7.0%: CPT | Mod: CPTII,95,, | Performed by: INTERNAL MEDICINE

## 2024-02-15 RX ORDER — IBUPROFEN 200 MG
200 TABLET ORAL EVERY 6 HOURS PRN
COMMUNITY

## 2024-02-15 NOTE — PROGRESS NOTES
Subjective:     Santiago Landaverde is here for follow up of Elevated Hepatic Enzymes    Patient is new to me.  Previously seen by my colleague.    HPI  Since Santiago Landaverde's last visit she denies any major issues.  She had been on Ozempic and was losing weight but it is no longer being covered by her insurance.  She does have issues with diabetes and insulin resistance along with PCOS.  Otherwise she has been feeling well.  She is here for follow up because her FibroScan showed F3 fibrosis and S3 steatosis.    Review of Systems    Objective:     Physical Exam    MELD 3.0: 8 at 11/29/2023  2:00 PM  MELD-Na: 7 at 11/29/2023  2:00 PM  Calculated from:  Serum Creatinine: 1.1 mg/dL at 11/29/2023  2:00 PM  Serum Sodium: 137 mmol/L at 11/29/2023  2:00 PM  Total Bilirubin: 0.4 mg/dL (Using min of 1 mg/dL) at 11/29/2023  2:00 PM  Serum Albumin: 4.2 g/dL (Using max of 3.5 g/dL) at 11/29/2023  2:00 PM  INR(ratio): 1.0 at 11/29/2023  2:00 PM  Age at listing (hypothetical): 33 years  Sex: Female at 11/29/2023  2:00 PM      WBC   Date Value Ref Range Status   11/29/2023 6.21 3.90 - 12.70 K/uL Final     Hemoglobin   Date Value Ref Range Status   11/29/2023 14.3 12.0 - 16.0 g/dL Final     Hematocrit   Date Value Ref Range Status   11/29/2023 43.5 37.0 - 48.5 % Final     Platelets   Date Value Ref Range Status   11/29/2023 263 150 - 450 K/uL Final     BUN   Date Value Ref Range Status   11/29/2023 13 6 - 20 mg/dL Final     Creatinine   Date Value Ref Range Status   11/29/2023 1.1 0.5 - 1.4 mg/dL Final     Glucose   Date Value Ref Range Status   11/29/2023 302 (H) 70 - 110 mg/dL Final     Calcium   Date Value Ref Range Status   11/29/2023 9.7 8.7 - 10.5 mg/dL Final     Sodium   Date Value Ref Range Status   11/29/2023 137 136 - 145 mmol/L Final     Potassium   Date Value Ref Range Status   11/29/2023 4.5 3.5 - 5.1 mmol/L Final     Chloride   Date Value Ref Range Status   11/29/2023 100 95 - 110 mmol/L Final     AST   Date Value Ref  Range Status   11/29/2023 47 (H) 10 - 40 U/L Final     ALT   Date Value Ref Range Status   11/29/2023 101 (H) 10 - 44 U/L Final     Alkaline Phosphatase   Date Value Ref Range Status   11/29/2023 82 55 - 135 U/L Final     Total Bilirubin   Date Value Ref Range Status   11/29/2023 0.4 0.1 - 1.0 mg/dL Final     Comment:     For infants and newborns, interpretation of results should be based  on gestational age, weight and in agreement with clinical  observations.    Premature Infant recommended reference ranges:  Up to 24 hours.............<8.0 mg/dL  Up to 48 hours............<12.0 mg/dL  3-5 days..................<15.0 mg/dL  6-29 days.................<15.0 mg/dL       Albumin   Date Value Ref Range Status   11/29/2023 4.2 3.5 - 5.2 g/dL Final     INR   Date Value Ref Range Status   11/29/2023 1.0 0.8 - 1.2 Final     Comment:     Coumadin Therapy:  2.0 - 3.0 for INR for all indicators except mechanical heart valves  and antiphospholipid syndromes which should use 2.5 - 3.5.           Assessment/Plan:     1. TIM (nonalcoholic steatohepatitis)    2. Elevated LFTs      Santiago Landaverde is a 33 y.o. female withElevated Hepatic Enzymes    TIM (nonalcoholic steatohepatitis)-concern that patient does have TIM with possible advanced fibrosis.  She would benefit from GLP 1 medications to assist with weight loss.  She needs weight loss, strict glycemic and lipid control to prevent progression to cirrhosis.  -will monitor labs and decide if liver biopsy is needed for further evaluation of fibrosis  -     Hepatic Function Panel; Future; Expected date: 02/15/2024  -     Hepatitis B Surface Ab, Qualitative; Future; Expected date: 02/15/2024  -     Hepatic Function Panel; Future; Expected date: 02/15/2024    Elevated LFTs-likely related to TIM; low concern for autoimmune hepatitis as patient has positive BRAXTON that this can be seen in fatty liver disease and other autoimmune markers are negative.  Additionally she has classic risk  factors that put her at risk for TIM.  -continue to monitor labs  -     Ambulatory referral/consult to Gastroenterology  -     Hepatic Function Panel; Future; Expected date: 02/15/2024  -     Hepatic Function Panel; Future; Expected date: 02/15/2024    RTC in 6 months with preclinic labs    The patient location is: [place]  The chief complaint leading to consultation is: See above in note    Visit type: audiovisual    Face to Face time with patient: 10 minutes  20 minutes of total time spent on the encounter, which includes face to face time and non-face to face time preparing to see the patient (eg, review of tests), Obtaining and/or reviewing separately obtained history, Documenting clinical information in the electronic or other health record, Independently interpreting results (not separately reported) and communicating results to the patient/family/caregiver, or Care coordination (not separately reported).         Each patient to whom he or she provides medical services by telemedicine is:  (1) informed of the relationship between the physician and patient and the respective role of any other health care provider with respect to management of the patient; and (2) notified that he or she may decline to receive medical services by telemedicine and may withdraw from such care at any time.        Belinda Mendoza MD

## 2024-02-15 NOTE — Clinical Note
Needs hepatic function and hep B surface antibody now and then RTC in 6 months with hepatic function

## 2024-02-15 NOTE — TELEPHONE ENCOUNTER
----- Message from Gerri Coker sent at 2/15/2024  2:29 PM CST -----  Contact: Nancy Sloan is calling in regards to the status of her call on 02/14 please call back as soon as possible.Due to her having to take another dose on 02/16.please call back at .[phone]             Thanks  AMADOU

## 2024-02-21 ENCOUNTER — TELEPHONE (OUTPATIENT)
Dept: DERMATOLOGY | Facility: CLINIC | Age: 34
End: 2024-02-21
Payer: COMMERCIAL

## 2024-02-21 NOTE — TELEPHONE ENCOUNTER
----- Message from Jacqui Miller sent at 2/21/2024  1:10 PM CST -----  Regarding: prior auth  [facility] called to get prior auth ,Please call [phone] case Id: [id number]. They need an approval within 48 hours or it will be put on hold .after that you will have to restart

## 2024-03-08 ENCOUNTER — LAB VISIT (OUTPATIENT)
Dept: LAB | Facility: HOSPITAL | Age: 34
End: 2024-03-08
Attending: STUDENT IN AN ORGANIZED HEALTH CARE EDUCATION/TRAINING PROGRAM
Payer: COMMERCIAL

## 2024-03-08 ENCOUNTER — OFFICE VISIT (OUTPATIENT)
Dept: DERMATOLOGY | Facility: CLINIC | Age: 34
End: 2024-03-08
Payer: COMMERCIAL

## 2024-03-08 DIAGNOSIS — L40.9 PSORIASIS: ICD-10-CM

## 2024-03-08 DIAGNOSIS — L40.9 PSORIASIS: Primary | ICD-10-CM

## 2024-03-08 LAB
ALBUMIN SERPL BCP-MCNC: 4.4 G/DL (ref 3.5–5.2)
ALP SERPL-CCNC: 92 U/L (ref 55–135)
ALT SERPL W/O P-5'-P-CCNC: 73 U/L (ref 10–44)
ANION GAP SERPL CALC-SCNC: 11 MMOL/L (ref 8–16)
AST SERPL-CCNC: 40 U/L (ref 10–40)
BASOPHILS # BLD AUTO: 0.04 K/UL (ref 0–0.2)
BASOPHILS NFR BLD: 0.5 % (ref 0–1.9)
BILIRUB SERPL-MCNC: 0.3 MG/DL (ref 0.1–1)
BUN SERPL-MCNC: 11 MG/DL (ref 6–20)
CALCIUM SERPL-MCNC: 10 MG/DL (ref 8.7–10.5)
CHLORIDE SERPL-SCNC: 102 MMOL/L (ref 95–110)
CO2 SERPL-SCNC: 24 MMOL/L (ref 23–29)
CREAT SERPL-MCNC: 1 MG/DL (ref 0.5–1.4)
DIFFERENTIAL METHOD BLD: NORMAL
EOSINOPHIL # BLD AUTO: 0.1 K/UL (ref 0–0.5)
EOSINOPHIL NFR BLD: 0.7 % (ref 0–8)
ERYTHROCYTE [DISTWIDTH] IN BLOOD BY AUTOMATED COUNT: 11.9 % (ref 11.5–14.5)
EST. GFR  (NO RACE VARIABLE): >60 ML/MIN/1.73 M^2
GLUCOSE SERPL-MCNC: 264 MG/DL (ref 70–110)
HCT VFR BLD AUTO: 45.4 % (ref 37–48.5)
HGB BLD-MCNC: 14.6 G/DL (ref 12–16)
IMM GRANULOCYTES # BLD AUTO: 0.04 K/UL (ref 0–0.04)
IMM GRANULOCYTES NFR BLD AUTO: 0.5 % (ref 0–0.5)
LYMPHOCYTES # BLD AUTO: 1.8 K/UL (ref 1–4.8)
LYMPHOCYTES NFR BLD: 22.8 % (ref 18–48)
MCH RBC QN AUTO: 29.8 PG (ref 27–31)
MCHC RBC AUTO-ENTMCNC: 32.2 G/DL (ref 32–36)
MCV RBC AUTO: 93 FL (ref 82–98)
MONOCYTES # BLD AUTO: 0.5 K/UL (ref 0.3–1)
MONOCYTES NFR BLD: 6.4 % (ref 4–15)
NEUTROPHILS # BLD AUTO: 5.6 K/UL (ref 1.8–7.7)
NEUTROPHILS NFR BLD: 69.1 % (ref 38–73)
NRBC BLD-RTO: 0 /100 WBC
PLATELET # BLD AUTO: 281 K/UL (ref 150–450)
PMV BLD AUTO: 11 FL (ref 9.2–12.9)
POTASSIUM SERPL-SCNC: 4.4 MMOL/L (ref 3.5–5.1)
PROT SERPL-MCNC: 8.1 G/DL (ref 6–8.4)
RBC # BLD AUTO: 4.9 M/UL (ref 4–5.4)
SODIUM SERPL-SCNC: 137 MMOL/L (ref 136–145)
WBC # BLD AUTO: 8.08 K/UL (ref 3.9–12.7)

## 2024-03-08 PROCEDURE — 80053 COMPREHEN METABOLIC PANEL: CPT | Performed by: STUDENT IN AN ORGANIZED HEALTH CARE EDUCATION/TRAINING PROGRAM

## 2024-03-08 PROCEDURE — 99214 OFFICE O/P EST MOD 30 MIN: CPT | Mod: S$GLB,,, | Performed by: STUDENT IN AN ORGANIZED HEALTH CARE EDUCATION/TRAINING PROGRAM

## 2024-03-08 PROCEDURE — 3044F HG A1C LEVEL LT 7.0%: CPT | Mod: CPTII,S$GLB,, | Performed by: STUDENT IN AN ORGANIZED HEALTH CARE EDUCATION/TRAINING PROGRAM

## 2024-03-08 PROCEDURE — 1159F MED LIST DOCD IN RCRD: CPT | Mod: CPTII,S$GLB,, | Performed by: STUDENT IN AN ORGANIZED HEALTH CARE EDUCATION/TRAINING PROGRAM

## 2024-03-08 PROCEDURE — 36415 COLL VENOUS BLD VENIPUNCTURE: CPT | Performed by: STUDENT IN AN ORGANIZED HEALTH CARE EDUCATION/TRAINING PROGRAM

## 2024-03-08 PROCEDURE — 1160F RVW MEDS BY RX/DR IN RCRD: CPT | Mod: CPTII,S$GLB,, | Performed by: STUDENT IN AN ORGANIZED HEALTH CARE EDUCATION/TRAINING PROGRAM

## 2024-03-08 PROCEDURE — 85025 COMPLETE CBC W/AUTO DIFF WBC: CPT | Performed by: STUDENT IN AN ORGANIZED HEALTH CARE EDUCATION/TRAINING PROGRAM

## 2024-03-08 PROCEDURE — 99999 PR PBB SHADOW E&M-EST. PATIENT-LVL II: CPT | Mod: PBBFAC,,, | Performed by: STUDENT IN AN ORGANIZED HEALTH CARE EDUCATION/TRAINING PROGRAM

## 2024-03-08 RX ORDER — CLINDAMYCIN PHOSPHATE 10 MG/ML
SOLUTION TOPICAL 2 TIMES DAILY
Qty: 60 EACH | Refills: 2 | Status: SHIPPED | OUTPATIENT
Start: 2024-03-08 | End: 2025-03-08

## 2024-03-08 NOTE — PROGRESS NOTES
Subjective:       Patient ID:  Santiago Landaverde is a 33 y.o. female who presents for   Chief Complaint   Patient presents with    Follow-up     History of Present Illness: The patient presents for follow up of psoriasis, last seen on 11/8/23 where she was started on Skyrizi. Reports since starting, has noticed much improvement in symptoms with clearing skin. Tolerating medication well with no major side effects. Has some nausea, but not much. Has had off and on in grown hairs on the buttocks, leading to occasional cysts. Otherwise, doing well.           Review of Systems   Constitutional:  Negative for fever and chills.   Skin:  Negative for itching, rash and dry skin.        Objective:    Physical Exam   Constitutional: She appears well-developed and well-nourished. No distress.   Neurological: She is alert and oriented to person, place, and time. She is not disoriented.   Psychiatric: She has a normal mood and affect.   Skin:   Areas Examined (abnormalities noted in diagram):   Head / Face Inspection Performed  Neck Inspection Performed  Chest / Axilla Inspection Performed  LUE Inspection Performed  RLE Inspected  LLE Inspection Performed              Diagram Legend     Erythematous scaling macule/papule c/w actinic keratosis       Vascular papule c/w angioma      Pigmented verrucoid papule/plaque c/w seborrheic keratosis      Yellow umbilicated papule c/w sebaceous hyperplasia      Irregularly shaped tan macule c/w lentigo     1-2 mm smooth white papules consistent with Milia      Movable subcutaneous cyst with punctum c/w epidermal inclusion cyst      Subcutaneous movable cyst c/w pilar cyst      Firm pink to brown papule c/w dermatofibroma      Pedunculated fleshy papule(s) c/w skin tag(s)      Evenly pigmented macule c/w junctional nevus     Mildly variegated pigmented, slightly irregular-bordered macule c/w mildly atypical nevus      Flesh colored to evenly pigmented papule c/w intradermal nevus       Pink  pearly papule/plaque c/w basal cell carcinoma      Erythematous hyperkeratotic cursted plaque c/w SCC      Surgical scar with no sign of skin cancer recurrence      Open and closed comedones      Inflammatory papules and pustules      Verrucoid papule consistent consistent with wart     Erythematous eczematous patches and plaques     Dystrophic onycholytic nail with subungual debris c/w onychomycosis     Umbilicated papule    Erythematous-base heme-crusted tan verrucoid plaque consistent with inflamed seborrheic keratosis     Erythematous Silvery Scaling Plaque c/w Psoriasis     See annotation      Assessment / Plan:        Psoriasis - much improvement since starting Skyrizi, tolerating well. Will continue at this time. Labs today. Add topical clindamycin to help with ingrown hairs on the buttocks.   -     Comprehensive Metabolic Panel; Future; Expected date: 03/08/2024  -     CBC Auto Differential; Future; Expected date: 03/08/2024  -     clindamycin (CLEOCIN T) 1 % Swab; Apply topically 2 (two) times daily.  Dispense: 60 each; Refill: 2         Follow up in about 6 months (around 9/8/2024).

## 2024-03-26 ENCOUNTER — CLINICAL SUPPORT (OUTPATIENT)
Dept: INTERNAL MEDICINE | Facility: CLINIC | Age: 34
End: 2024-03-26
Payer: COMMERCIAL

## 2024-03-26 DIAGNOSIS — R73.03 PREDIABETES: ICD-10-CM

## 2024-03-26 DIAGNOSIS — K74.60 CIRRHOSIS OF LIVER WITHOUT ASCITES, UNSPECIFIED HEPATIC CIRRHOSIS TYPE: ICD-10-CM

## 2024-03-26 PROCEDURE — 97802 MEDICAL NUTRITION INDIV IN: CPT | Mod: 95,,, | Performed by: DIETITIAN, REGISTERED

## 2024-03-26 NOTE — PROGRESS NOTES
The patient location is: [place]  The chief complaint leading to consultation is: nutrition referral    Visit type: audio only       45 minutes of total time spent on the encounter, which includes face to face time and non-face to face time preparing to see the patient (eg, review of tests), Obtaining and/or reviewing separately obtained history, Documenting clinical information in the electronic or other health record, Independently interpreting results (not separately reported) and communicating results to the patient/family/caregiver, or Care coordination (not separately reported).         Each patient to whom he or she provides medical services by telemedicine is:  (1) informed of the relationship between the physician and patient and the respective role of any other health care provider with respect to management of the patient; and (2) notified that he or she may decline to receive medical services by telemedicine and may withdraw from such care at any time.    Notes:   Nutrition Assessment        Client name:  Santiago Landaverde  :  1990  Age:  33 y.o.  Gender:  female    Client states:  referred by Mary Gonzalez DO with a diagnosis of:  -Cirrhosis of liver without ascites, unspecified hepatic cirrhosis type  -Prediabetes     Reports being diagnosed with diabetes in 2023.  Prediabetes diagnosis was given prior to that in May 2023.    Currently prescribed Ozempic and Januvia which was working well in the beginning. Reports being full pretty quickly when eating a meal.  Believes dosages need to be increased now due to feeling an increase in appetite.     Patient with goal to lose weight.  During pandemic: moved to [place], was able to walk plenty. Followed keto diet but not high fat. Lost 30-40 lbs    Reports overall not eating much food but the food choices consumed tend to be calorie dense.    Enjoys meats with meats especially fatty meats.  Likes vegetables.  Does not drink sweet drinks  "except for juice on a rare occasion.   Daily drink choice is typically unsweetened tea.     Reports ADHD. Obsess over same food choice for a period of time    Sample day:  Breakfast: typically none// weekends may have some: leftovers  Lunch: Baked chicken, broccoli rice, + green beans  Dinner: same as lunch  Snacks: maybe once per week// smaller version of meals    Dining out lately has increased due to work schedule.    Exercise: none other than walking her dog daily// bought a walking treadmill but does not use it      Anthropometrics  Height:  59"        RECENT WEIGHTS      Weight (lb) Weight (kg) BMI (Calculated)   10/11/2023 163  73.936  32.9    10/18/2023 167  75.751  33.7    11/8/2023 167.11  75.8  33.7    11/29/2023 166.23  75.4  33.6    12/19/2023 165.35  75  33.4    1/11/2024 162.26  73.6     2/15/2024 160  72.576  32.3          Clinical Signs/Symptoms  N/V/D:   none noted  Appetite:  good       Past Medical History:   Diagnosis Date    Hyperlipidemia     Insulin resistance        No past surgical history on file.    Medications    has a current medication list which includes the following prescription(s): clindamycin, ibuprofen, januvia, medroxyprogesterone, mometasone, ozempic, skyrizi, and vyvanse.    Vitamins, Minerals, and/or Supplements:  not discussed     Food/Medication Interactions:  Reviewed     Food Allergies or Intolerances:  NKFA noted In chart     Social History    Marital status:    Employment:  yes    Social History     Tobacco Use    Smoking status: Former     Types: Cigarettes    Smokeless tobacco: Never   Substance Use Topics    Alcohol use: Never        Lab Reports   Sodium   Date Value Ref Range Status   03/08/2024 137 136 - 145 mmol/L Final     Potassium   Date Value Ref Range Status   03/08/2024 4.4 3.5 - 5.1 mmol/L Final     Chloride   Date Value Ref Range Status   03/08/2024 102 95 - 110 mmol/L Final     CO2   Date Value Ref Range Status   03/08/2024 24 23 - 29 mmol/L Final " "    Glucose   Date Value Ref Range Status   03/08/2024 264 (H) 70 - 110 mg/dL Final     BUN   Date Value Ref Range Status   03/08/2024 11 6 - 20 mg/dL Final     Creatinine   Date Value Ref Range Status   03/08/2024 1.0 0.5 - 1.4 mg/dL Final     Calcium   Date Value Ref Range Status   03/08/2024 10.0 8.7 - 10.5 mg/dL Final     Total Protein   Date Value Ref Range Status   03/08/2024 8.1 6.0 - 8.4 g/dL Final     Albumin   Date Value Ref Range Status   03/08/2024 4.4 3.5 - 5.2 g/dL Final     Total Bilirubin   Date Value Ref Range Status   03/08/2024 0.3 0.1 - 1.0 mg/dL Final     Comment:     For infants and newborns, interpretation of results should be based  on gestational age, weight and in agreement with clinical  observations.    Premature Infant recommended reference ranges:  Up to 24 hours.............<8.0 mg/dL  Up to 48 hours............<12.0 mg/dL  3-5 days..................<15.0 mg/dL  6-29 days.................<15.0 mg/dL       Alkaline Phosphatase   Date Value Ref Range Status   03/08/2024 92 55 - 135 U/L Final     AST   Date Value Ref Range Status   03/08/2024 40 10 - 40 U/L Final     ALT   Date Value Ref Range Status   03/08/2024 73 (H) 10 - 44 U/L Final     Anion Gap   Date Value Ref Range Status   03/08/2024 11 8 - 16 mmol/L Final      Lab Results   Component Value Date    WBC 8.08 03/08/2024    HGB 14.6 03/08/2024    HCT 45.4 03/08/2024    MCV 93 03/08/2024     03/08/2024        No results found for: "CHOL"  No results found for: "HDL"  No results found for: "LDLCALC"  No results found for: "TRIG"  No results found for: "CHOLHDL"  Lab Results   Component Value Date    HGBA1C 6.3 (H) 01/11/2024     BP Readings from Last 1 Encounters:   01/11/24 126/84       Food History  Provided above    Exercise History:  provided above    Cultural/Spiritual/Personal Preferences:  None identified    Support System:  spouse    State of Change:  Contemplation    Barriers to Change:  none    Diagnosis    obesity " related to excess energy intake as evidenced by BMI 32.    Intervention    RMR (Method:  [place]):  1335 kcal  Activity Factor:  1.2    JENNIFER:  1602 - 250 = 1352 kcal    Goals:  1.  1350 calories daily divided evenly amongst 3 meals (450 calories per meal)  2.  135 grams carbohydrates per day (45 grams of carbohydrates per meal)  3.  Consider daily fiber supplement. Include part of dosage with 2 meals daily.  4. When consuming high fat meats, pair with low fat/calorie vegetable and complex carbohydrate choice.   5. Consider participating in 5 minute walks after each meal.     Nutrition Education  The following education was provided to the patient:  Discussed meal planning/[employer] My Plate design.  Discussed weight management.  Suggested dietary modifications based on current dietary behaviors and individual food preferences.  Discussed nutrition therapy for diabetes      Patient verbalized understanding of nutrition education and recommendations received.    Handouts Provided, emailed  Fueling Well on the Go  Eat Fit Shopping List  Balanced Diabetes Plate    Monitoring/Evaluation    Monitor the following:  Weight  BMI  Caloric intake  Labs:  HgbA1c    Follow Up Plan:  as needed

## 2024-04-10 NOTE — PROGRESS NOTES
Subjective     Patient ID: Santiago Landaverde is a 33 y.o. female.    Chief Complaint: Follow-up (3 months)      HPI  Patient presents for 3 month follow up on Prediabetes and for Vyvanse. She reports her current dose of Vyvanse is no longer working. She requests an increase in dose  Review of Systems   Constitutional:  Negative for chills and fatigue.   Respiratory:  Negative for chest tightness and shortness of breath.    Cardiovascular:  Negative for chest pain and palpitations.   Gastrointestinal:  Negative for abdominal pain, constipation, diarrhea, nausea and vomiting.   Genitourinary:  Negative for frequency and urgency.   Neurological:  Negative for dizziness, numbness and headaches.          Objective       Current Outpatient Medications:     clindamycin (CLEOCIN T) 1 % Swab, Apply topically 2 (two) times daily., Disp: 60 each, Rfl: 2    ibuprofen (ADVIL,MOTRIN) 200 MG tablet, Take 200 mg by mouth every 6 (six) hours as needed for Pain., Disp: , Rfl:     JANUVIA 50 mg Tab, Take 1 tablet (50 mg total) by mouth every morning., Disp: 90 tablet, Rfl: 3    risankizumab-rzaa (SKYRIZI) 150 mg/mL PnIj, Inject 150 mg every 12 weeks., Disp: 1 mL, Rfl: 3    lisdexamfetamine (VYVANSE) 20 MG capsule, Take 1 capsule (20 mg total) by mouth every morning., Disp: 30 capsule, Rfl: 0    medroxyPROGESTERone (PROVERA) 10 MG tablet, Take 1 tablet (10 mg total) by mouth once daily. Take 1 pill po daily x 10 days each month for 3 months. for 10 days, Disp: 10 tablet, Rfl: 2    mometasone (ELOCON) 0.1 % ointment, Apply topically once daily., Disp: 45 g, Rfl: 3    semaglutide (OZEMPIC) 1 mg/dose (4 mg/3 mL), Inject 1 mg into the skin every 7 days., Disp: 3 mL, Rfl: 11     Physical Exam  Constitutional:       General: She is not in acute distress.     Appearance: Normal appearance. She is obese.   HENT:      Head: Normocephalic and atraumatic.   Cardiovascular:      Rate and Rhythm: Normal rate and regular rhythm.      Heart sounds:  Normal heart sounds. No murmur heard.     No friction rub. No gallop.   Pulmonary:      Effort: Pulmonary effort is normal.      Breath sounds: Normal breath sounds. No wheezing, rhonchi or rales.   Abdominal:      General: Bowel sounds are normal. There is no distension.      Palpations: Abdomen is soft.      Tenderness: There is no abdominal tenderness. There is no rebound.   Skin:     General: Skin is warm and dry.      Coloration: Skin is not jaundiced.   Neurological:      General: No focal deficit present.      Mental Status: She is alert and oriented to person, place, and time. Mental status is at baseline.   Psychiatric:         Mood and Affect: Mood normal.         Behavior: Behavior normal.            Assessment and Plan     1. Prediabetes  -     semaglutide (OZEMPIC) 1 mg/dose (4 mg/3 mL); Inject 1 mg into the skin every 7 days.  Dispense: 3 mL; Refill: 11  -     Hemoglobin A1C; Future; Expected date: 04/11/2024  -     Lipid Panel; Future; Expected date: 04/11/2024    2. Attention deficit hyperactivity disorder (ADHD), predominantly inattentive type  -     lisdexamfetamine (VYVANSE) 20 MG capsule; Take 1 capsule (20 mg total) by mouth every morning.  Dispense: 30 capsule; Refill: 0      Will refill Ozempic  Will obtain A1c and Lipid panel  Will increase Vyvanse to 20mg         No follow-ups on file.

## 2024-04-11 ENCOUNTER — LAB VISIT (OUTPATIENT)
Dept: LAB | Facility: HOSPITAL | Age: 34
End: 2024-04-11
Attending: INTERNAL MEDICINE
Payer: COMMERCIAL

## 2024-04-11 ENCOUNTER — PATIENT MESSAGE (OUTPATIENT)
Dept: FAMILY MEDICINE | Facility: CLINIC | Age: 34
End: 2024-04-11

## 2024-04-11 ENCOUNTER — OFFICE VISIT (OUTPATIENT)
Dept: FAMILY MEDICINE | Facility: CLINIC | Age: 34
End: 2024-04-11
Payer: COMMERCIAL

## 2024-04-11 VITALS
SYSTOLIC BLOOD PRESSURE: 122 MMHG | HEIGHT: 59 IN | HEART RATE: 98 BPM | DIASTOLIC BLOOD PRESSURE: 78 MMHG | WEIGHT: 163.5 LBS | TEMPERATURE: 97 F | OXYGEN SATURATION: 96 % | RESPIRATION RATE: 18 BRPM | BODY MASS INDEX: 32.96 KG/M2

## 2024-04-11 DIAGNOSIS — R73.03 PREDIABETES: ICD-10-CM

## 2024-04-11 DIAGNOSIS — R73.03 PREDIABETES: Primary | ICD-10-CM

## 2024-04-11 DIAGNOSIS — F90.0 ATTENTION DEFICIT HYPERACTIVITY DISORDER (ADHD), PREDOMINANTLY INATTENTIVE TYPE: ICD-10-CM

## 2024-04-11 LAB
CHOLEST SERPL-MCNC: 169 MG/DL (ref 120–199)
CHOLEST/HDLC SERPL: 4.4 {RATIO} (ref 2–5)
ESTIMATED AVG GLUCOSE: 186 MG/DL (ref 68–131)
HBA1C MFR BLD: 8.1 % (ref 4–5.6)
HDLC SERPL-MCNC: 38 MG/DL (ref 40–75)
HDLC SERPL: 22.5 % (ref 20–50)
LDLC SERPL CALC-MCNC: 79.4 MG/DL (ref 63–159)
NONHDLC SERPL-MCNC: 131 MG/DL
TRIGL SERPL-MCNC: 258 MG/DL (ref 30–150)

## 2024-04-11 PROCEDURE — 3008F BODY MASS INDEX DOCD: CPT | Mod: CPTII,S$GLB,, | Performed by: INTERNAL MEDICINE

## 2024-04-11 PROCEDURE — 3044F HG A1C LEVEL LT 7.0%: CPT | Mod: CPTII,S$GLB,, | Performed by: INTERNAL MEDICINE

## 2024-04-11 PROCEDURE — 3074F SYST BP LT 130 MM HG: CPT | Mod: CPTII,S$GLB,, | Performed by: INTERNAL MEDICINE

## 2024-04-11 PROCEDURE — 99999 PR PBB SHADOW E&M-EST. PATIENT-LVL III: CPT | Mod: PBBFAC,,, | Performed by: INTERNAL MEDICINE

## 2024-04-11 PROCEDURE — 3078F DIAST BP <80 MM HG: CPT | Mod: CPTII,S$GLB,, | Performed by: INTERNAL MEDICINE

## 2024-04-11 PROCEDURE — 80061 LIPID PANEL: CPT | Performed by: INTERNAL MEDICINE

## 2024-04-11 PROCEDURE — 83036 HEMOGLOBIN GLYCOSYLATED A1C: CPT | Performed by: INTERNAL MEDICINE

## 2024-04-11 PROCEDURE — 99214 OFFICE O/P EST MOD 30 MIN: CPT | Mod: S$GLB,,, | Performed by: INTERNAL MEDICINE

## 2024-04-11 PROCEDURE — 1159F MED LIST DOCD IN RCRD: CPT | Mod: CPTII,S$GLB,, | Performed by: INTERNAL MEDICINE

## 2024-04-11 PROCEDURE — 36415 COLL VENOUS BLD VENIPUNCTURE: CPT | Mod: PO | Performed by: INTERNAL MEDICINE

## 2024-04-11 RX ORDER — LISDEXAMFETAMINE DIMESYLATE CAPSULES 20 MG/1
20 CAPSULE ORAL EVERY MORNING
Qty: 30 CAPSULE | Refills: 0 | Status: SHIPPED | OUTPATIENT
Start: 2024-04-11

## 2024-04-11 RX ORDER — SEMAGLUTIDE 1.34 MG/ML
1 INJECTION, SOLUTION SUBCUTANEOUS
Qty: 3 ML | Refills: 11 | Status: SHIPPED | OUTPATIENT
Start: 2024-04-11 | End: 2024-04-15 | Stop reason: SDUPTHER

## 2024-04-12 ENCOUNTER — PATIENT MESSAGE (OUTPATIENT)
Dept: FAMILY MEDICINE | Facility: CLINIC | Age: 34
End: 2024-04-12
Payer: COMMERCIAL

## 2024-04-12 DIAGNOSIS — E11.9 TYPE 2 DIABETES MELLITUS WITHOUT COMPLICATION, WITHOUT LONG-TERM CURRENT USE OF INSULIN: Primary | ICD-10-CM

## 2024-04-12 DIAGNOSIS — R73.03 PREDIABETES: ICD-10-CM

## 2024-04-15 RX ORDER — SEMAGLUTIDE 1.34 MG/ML
1 INJECTION, SOLUTION SUBCUTANEOUS
Qty: 3 ML | Refills: 11 | Status: SHIPPED | OUTPATIENT
Start: 2024-04-15 | End: 2025-04-15

## 2024-05-06 DIAGNOSIS — F90.0 ATTENTION DEFICIT HYPERACTIVITY DISORDER (ADHD), PREDOMINANTLY INATTENTIVE TYPE: ICD-10-CM

## 2024-05-06 NOTE — TELEPHONE ENCOUNTER
Good afternoon,    I have sent your request to another provider since Dr. Gonzalez is out of the office this week do to an emergency.

## 2024-05-06 NOTE — TELEPHONE ENCOUNTER
No care due was identified.  [facility] Embedded Care Due Messages. Reference number: [id number].   5/06/2024 4:36:46 PM CDT

## 2024-05-07 RX ORDER — LISDEXAMFETAMINE DIMESYLATE CAPSULES 20 MG/1
20 CAPSULE ORAL EVERY MORNING
Qty: 30 CAPSULE | Refills: 0 | OUTPATIENT
Start: 2024-05-07

## 2024-05-14 ENCOUNTER — LAB VISIT (OUTPATIENT)
Dept: LAB | Facility: HOSPITAL | Age: 34
End: 2024-05-14
Attending: INTERNAL MEDICINE
Payer: COMMERCIAL

## 2024-05-14 ENCOUNTER — TELEPHONE (OUTPATIENT)
Dept: HEPATOLOGY | Facility: CLINIC | Age: 34
End: 2024-05-14
Payer: COMMERCIAL

## 2024-05-14 DIAGNOSIS — K75.81 NASH (NONALCOHOLIC STEATOHEPATITIS): ICD-10-CM

## 2024-05-14 DIAGNOSIS — R79.89 ELEVATED LFTS: ICD-10-CM

## 2024-05-14 LAB
ALBUMIN SERPL BCP-MCNC: 4.1 G/DL (ref 3.5–5.2)
ALP SERPL-CCNC: 93 U/L (ref 55–135)
ALT SERPL W/O P-5'-P-CCNC: 86 U/L (ref 10–44)
AST SERPL-CCNC: 42 U/L (ref 10–40)
BILIRUB DIRECT SERPL-MCNC: 0.1 MG/DL (ref 0.1–0.3)
BILIRUB SERPL-MCNC: 0.3 MG/DL (ref 0.1–1)
PROT SERPL-MCNC: 8 G/DL (ref 6–8.4)

## 2024-05-14 PROCEDURE — 80076 HEPATIC FUNCTION PANEL: CPT | Performed by: INTERNAL MEDICINE

## 2024-05-14 PROCEDURE — 36415 COLL VENOUS BLD VENIPUNCTURE: CPT | Mod: PO | Performed by: INTERNAL MEDICINE

## 2024-05-16 ENCOUNTER — OFFICE VISIT (OUTPATIENT)
Dept: HEPATOLOGY | Facility: CLINIC | Age: 34
End: 2024-05-16
Payer: COMMERCIAL

## 2024-05-16 VITALS — WEIGHT: 163.56 LBS | HEIGHT: 59 IN | BODY MASS INDEX: 32.97 KG/M2

## 2024-05-16 DIAGNOSIS — K75.81 NASH (NONALCOHOLIC STEATOHEPATITIS): Primary | ICD-10-CM

## 2024-05-16 PROCEDURE — 1159F MED LIST DOCD IN RCRD: CPT | Mod: CPTII,95,, | Performed by: INTERNAL MEDICINE

## 2024-05-16 PROCEDURE — 1160F RVW MEDS BY RX/DR IN RCRD: CPT | Mod: CPTII,95,, | Performed by: INTERNAL MEDICINE

## 2024-05-16 PROCEDURE — 99213 OFFICE O/P EST LOW 20 MIN: CPT | Mod: 95,,, | Performed by: INTERNAL MEDICINE

## 2024-05-16 PROCEDURE — 3008F BODY MASS INDEX DOCD: CPT | Mod: CPTII,95,, | Performed by: INTERNAL MEDICINE

## 2024-05-16 PROCEDURE — 3052F HG A1C>EQUAL 8.0%<EQUAL 9.0%: CPT | Mod: CPTII,95,, | Performed by: INTERNAL MEDICINE

## 2024-05-16 RX ORDER — RESMETIROM 80 MG/1
80 TABLET, COATED ORAL DAILY
Qty: 90 TABLET | Refills: 3 | Status: ACTIVE | OUTPATIENT
Start: 2024-05-16

## 2024-05-16 NOTE — PROGRESS NOTES
Subjective:     Santiago Landaverde is here for follow up of TIM      HPI  Since Santiago Landaverde's last visit she denies any significant issues. She still has not been able to get on a GLP1. No liver issues.    Review of Systems    Objective:     Physical Exam    MELD 3.0: 8 at 11/29/2023  2:00 PM  MELD-Na: 7 at 11/29/2023  2:00 PM  Calculated from:  Serum Creatinine: 1.1 mg/dL at 11/29/2023  2:00 PM  Serum Sodium: 137 mmol/L at 11/29/2023  2:00 PM  Total Bilirubin: 0.4 mg/dL (Using min of 1 mg/dL) at 11/29/2023  2:00 PM  Serum Albumin: 4.2 g/dL (Using max of 3.5 g/dL) at 11/29/2023  2:00 PM  INR(ratio): 1.0 at 11/29/2023  2:00 PM  Age at listing (hypothetical): 33 years  Sex: Female at 11/29/2023  2:00 PM      WBC   Date Value Ref Range Status   03/08/2024 8.08 3.90 - 12.70 K/uL Final     Hemoglobin   Date Value Ref Range Status   03/08/2024 14.6 12.0 - 16.0 g/dL Final     Hematocrit   Date Value Ref Range Status   03/08/2024 45.4 37.0 - 48.5 % Final     Platelets   Date Value Ref Range Status   03/08/2024 281 150 - 450 K/uL Final     BUN   Date Value Ref Range Status   03/08/2024 11 6 - 20 mg/dL Final     Creatinine   Date Value Ref Range Status   03/08/2024 1.0 0.5 - 1.4 mg/dL Final     Glucose   Date Value Ref Range Status   03/08/2024 264 (H) 70 - 110 mg/dL Final     Calcium   Date Value Ref Range Status   03/08/2024 10.0 8.7 - 10.5 mg/dL Final     Sodium   Date Value Ref Range Status   03/08/2024 137 136 - 145 mmol/L Final     Potassium   Date Value Ref Range Status   03/08/2024 4.4 3.5 - 5.1 mmol/L Final     Chloride   Date Value Ref Range Status   03/08/2024 102 95 - 110 mmol/L Final     AST   Date Value Ref Range Status   05/14/2024 42 (H) 10 - 40 U/L Final     ALT   Date Value Ref Range Status   05/14/2024 86 (H) 10 - 44 U/L Final     Alkaline Phosphatase   Date Value Ref Range Status   05/14/2024 93 55 - 135 U/L Final     Total Bilirubin   Date Value Ref Range Status   05/14/2024 0.3 0.1 - 1.0 mg/dL Final      Comment:     For infants and newborns, interpretation of results should be based  on gestational age, weight and in agreement with clinical  observations.    Premature Infant recommended reference ranges:  Up to 24 hours.............<8.0 mg/dL  Up to 48 hours............<12.0 mg/dL  3-5 days..................<15.0 mg/dL  6-29 days.................<15.0 mg/dL       Albumin   Date Value Ref Range Status   05/14/2024 4.1 3.5 - 5.2 g/dL Final     INR   Date Value Ref Range Status   11/29/2023 1.0 0.8 - 1.2 Final     Comment:     Coumadin Therapy:  2.0 - 3.0 for INR for all indicators except mechanical heart valves  and antiphospholipid syndromes which should use 2.5 - 3.5.           Assessment/Plan:     1. TIM (nonalcoholic steatohepatitis)      Santiago Landaverde is a 33 y.o. female withNASH    TIM (nonalcoholic steatohepatitis)-stage 3 fibrosis.  -She would benefit from Rezdiffra  -Continue to work on weight loss, glycemic and lipid control  -     resmetirom (REZDIFFRA) 80 mg Tab; Take 80 mg by mouth once daily.  Dispense: 90 tablet; Refill: 3  -     Hepatic Function Panel; Standing    RTC in 6 months with preclinic labs    The patient location is: [place]  The chief complaint leading to consultation is: See above in note    Visit type: audiovisual    Face to Face time with patient: 10 minutes  20 minutes of total time spent on the encounter, which includes face to face time and non-face to face time preparing to see the patient (eg, review of tests), Obtaining and/or reviewing separately obtained history, Documenting clinical information in the electronic or other health record, Independently interpreting results (not separately reported) and communicating results to the patient/family/caregiver, or Care coordination (not separately reported).         Each patient to whom he or she provides medical services by telemedicine is:  (1) informed of the relationship between the physician and patient and the respective  role of any other health care provider with respect to management of the patient; and (2) notified that he or she may decline to receive medical services by telemedicine and may withdraw from such care at any time.        Belinda Mendoza MD

## 2024-05-16 NOTE — TELEPHONE ENCOUNTER
Santiago Landaverde  2024 10:20 AM   Office Visit  MRN: [id number]  Description: Female : 1990 Provider: Mary Gonzalez DO Department: [facility] Dept Phone: [phone]     Medication  lisdexamfetamine (VYVANSE) 20 MG capsule [[id number]]  Outpatient Medication Detail     Disp Refills Start End LENNIE   lisdexamfetamine (VYVANSE) 20 MG capsule 30 capsule 0 2024 -- No   Sig - Route: Take 1 capsule (20 mg total) by mouth every morning. - Oral   Sent to pharmacy as: lisdexamfetamine (VYVANSE) 20 MG capsule   Class: Normal   Earliest Fill Date: 2024   Order: [id number]   Date/Time Signed: 2024 10:41       E-Prescribing Status: Receipt confirmed by pharmacy (2024 10:42 AM CDT)

## 2024-05-24 PROBLEM — K75.81 NASH (NONALCOHOLIC STEATOHEPATITIS): Status: ACTIVE | Noted: 2024-05-24

## 2024-06-28 NOTE — PROGRESS NOTES
Subjective     Patient ID: Santiago Landaverde is a 33 y.o. female.    Chief Complaint: Otalgia      HPI  Patient presents due to right ear pain. She states the pain worsens when she uses a Q-tip. She has had some disequilibrium as well.    Review of Systems   Constitutional:  Negative for chills and fatigue.   HENT:  Positive for ear pain.    Respiratory:  Negative for chest tightness and shortness of breath.    Cardiovascular:  Negative for chest pain and palpitations.   Gastrointestinal:  Negative for abdominal pain, constipation, diarrhea, nausea and vomiting.   Genitourinary:  Negative for frequency and urgency.   Neurological:  Negative for dizziness, numbness and headaches.          Objective       Current Outpatient Medications:     clindamycin (CLEOCIN T) 1 % Swab, Apply topically 2 (two) times daily., Disp: 60 each, Rfl: 2    ibuprofen (ADVIL,MOTRIN) 200 MG tablet, Take 200 mg by mouth every 6 (six) hours as needed for Pain., Disp: , Rfl:     JANUVIA 50 mg Tab, Take 1 tablet (50 mg total) by mouth every morning., Disp: 90 tablet, Rfl: 3    mometasone (ELOCON) 0.1 % ointment, Apply topically once daily., Disp: 45 g, Rfl: 3    resmetirom (REZDIFFRA) 80 mg Tab, Take 80 mg by mouth once daily., Disp: 90 tablet, Rfl: 3    risankizumab-rzaa (SKYRIZI) 150 mg/mL PnIj, Inject 150 mg every 12 weeks., Disp: 1 mL, Rfl: 3    semaglutide (OZEMPIC) 1 mg/dose (4 mg/3 mL), Inject 1 mg into the skin every 7 days., Disp: 3 mL, Rfl: 11    lisdexamfetamine (VYVANSE) 20 MG capsule, Take 1 capsule (20 mg total) by mouth every morning., Disp: 30 capsule, Rfl: 0     Physical Exam  Constitutional:       General: She is not in acute distress.     Appearance: Normal appearance. She is obese.   HENT:      Head: Normocephalic and atraumatic.      Ears:      Comments: Erythema of bilateral ear canals. No exudate  Cardiovascular:      Rate and Rhythm: Normal rate and regular rhythm.      Heart sounds: Normal heart sounds. No murmur heard.      No friction rub. No gallop.   Pulmonary:      Effort: Pulmonary effort is normal.      Breath sounds: Normal breath sounds. No wheezing, rhonchi or rales.   Abdominal:      General: Bowel sounds are normal. There is no distension.      Palpations: Abdomen is soft.      Tenderness: There is no abdominal tenderness. There is no rebound.   Skin:     General: Skin is warm and dry.      Coloration: Skin is not jaundiced.   Neurological:      General: No focal deficit present.      Mental Status: She is alert and oriented to person, place, and time. Mental status is at baseline.   Psychiatric:         Mood and Affect: Mood normal.         Behavior: Behavior normal.            Assessment and Plan     1. Irritation of ear, bilateral  Comments:  Advised patient to stop using Q-tips and use hydrogen peroxide for ear cleaning    2. Attention deficit hyperactivity disorder (ADHD), predominantly inattentive type  Comments:  Will refill Vyvanse  Orders:  -     lisdexamfetamine (VYVANSE) 20 MG capsule; Take 1 capsule (20 mg total) by mouth every morning.  Dispense: 30 capsule; Refill: 0               No follow-ups on file.

## 2024-07-01 ENCOUNTER — OFFICE VISIT (OUTPATIENT)
Dept: FAMILY MEDICINE | Facility: CLINIC | Age: 34
End: 2024-07-01
Payer: COMMERCIAL

## 2024-07-01 VITALS
HEART RATE: 94 BPM | RESPIRATION RATE: 14 BRPM | SYSTOLIC BLOOD PRESSURE: 120 MMHG | DIASTOLIC BLOOD PRESSURE: 78 MMHG | TEMPERATURE: 98 F | BODY MASS INDEX: 32.95 KG/M2 | WEIGHT: 163.13 LBS

## 2024-07-01 DIAGNOSIS — F90.0 ATTENTION DEFICIT HYPERACTIVITY DISORDER (ADHD), PREDOMINANTLY INATTENTIVE TYPE: ICD-10-CM

## 2024-07-01 DIAGNOSIS — H93.8X3 IRRITATION OF EAR, BILATERAL: Primary | ICD-10-CM

## 2024-07-01 PROCEDURE — 3052F HG A1C>EQUAL 8.0%<EQUAL 9.0%: CPT | Mod: CPTII,S$GLB,, | Performed by: INTERNAL MEDICINE

## 2024-07-01 PROCEDURE — 99999 PR PBB SHADOW E&M-EST. PATIENT-LVL III: CPT | Mod: PBBFAC,,, | Performed by: INTERNAL MEDICINE

## 2024-07-01 PROCEDURE — 99213 OFFICE O/P EST LOW 20 MIN: CPT | Mod: S$GLB,,, | Performed by: INTERNAL MEDICINE

## 2024-07-01 PROCEDURE — 3074F SYST BP LT 130 MM HG: CPT | Mod: CPTII,S$GLB,, | Performed by: INTERNAL MEDICINE

## 2024-07-01 PROCEDURE — 3008F BODY MASS INDEX DOCD: CPT | Mod: CPTII,S$GLB,, | Performed by: INTERNAL MEDICINE

## 2024-07-01 PROCEDURE — 3078F DIAST BP <80 MM HG: CPT | Mod: CPTII,S$GLB,, | Performed by: INTERNAL MEDICINE

## 2024-07-01 RX ORDER — LISDEXAMFETAMINE DIMESYLATE CAPSULES 20 MG/1
20 CAPSULE ORAL EVERY MORNING
Qty: 30 CAPSULE | Refills: 0 | Status: SHIPPED | OUTPATIENT
Start: 2024-07-01

## 2024-07-01 RX ORDER — LISDEXAMFETAMINE DIMESYLATE CAPSULES 20 MG/1
20 CAPSULE ORAL EVERY MORNING
Qty: 30 CAPSULE | Refills: 0 | Status: CANCELLED | OUTPATIENT
Start: 2024-07-01

## 2024-07-01 NOTE — TELEPHONE ENCOUNTER
No care due was identified.  [facility] Embedded Care Due Messages. Reference number: [id number].   7/01/2024 9:35:20 AM CDT

## 2024-07-24 ENCOUNTER — PATIENT MESSAGE (OUTPATIENT)
Dept: HEPATOLOGY | Facility: CLINIC | Age: 34
End: 2024-07-24
Payer: COMMERCIAL

## 2024-08-27 ENCOUNTER — TELEPHONE (OUTPATIENT)
Dept: HEPATOLOGY | Facility: CLINIC | Age: 34
End: 2024-08-27
Payer: COMMERCIAL

## 2024-08-27 NOTE — TELEPHONE ENCOUNTER
----- Message from Candida Underwood sent at 8/27/2024 11:05 AM CDT -----  Regarding: PA  Contact: Fanny liao/[facility] @[phone]  Caller asking for update to the PA for the medication listed. The form was sent on the 22nd to be filled out signed and returned to process the medication. Pls call to better discuss.   resmetirom (REZDIFFRA) 80 mg Tab      [address] [address]  Phone: [phone] Fax: [phone]

## 2024-08-28 ENCOUNTER — TELEPHONE (OUTPATIENT)
Dept: HEPATOLOGY | Facility: CLINIC | Age: 34
End: 2024-08-28
Payer: COMMERCIAL

## 2024-08-28 NOTE — TELEPHONE ENCOUNTER
----- Message from Koby Fajardo sent at 8/28/2024  9:07 AM CDT -----  Contact: jon/[facility]   Type:  Pharmacy Calling to Clarify an RX    Name of Caller:Jon  Pharmacy Name:[facility]  Prescription Name:resmetirom (REZDIFFRA) 80 mg Tab   [[id number]]  What do they need to clarify?:prior authorization needed   Best Call Back Number:[phone]   Additional Information: wants to do prior authorization verbally   Case number - [id number]

## 2024-08-28 NOTE — TELEPHONE ENCOUNTER
Attempted to return call for the second time and was continuously transferred unable to get to the person needed.

## 2024-09-12 NOTE — PROGRESS NOTES
Patient ID: Santiago Landaverde is a 34 y.o. female.    Chief Complaint: Shortness of Breath (Over a week ) and Chest Pain      History of Present Illness    The patient presents with shortness of breath and chest pain.    Ms. Landaverde reports shortness of breath and chest pain. The shortness of breath occurs intermittently but has worsened over the last couple of days. The patient has difficulty obtaining sufficient oxygen during normal respiration, necessitating deep breaths, which occasionally provide relief. Symptoms occur at any time, not exclusively during exercise, but exacerbate with physical activity such as treadmill walking. The shortness of breath intensifies in poorly ventilated environments, though the patient has been using air conditioning constantly to alleviate this.    The chest pain started over a month ago, initially presenting as a constant sensation in the chest area, extending under the breast. The patient would notice it during periods of relaxation but could disregard it when not focusing on it. Recently, the pain has become more acute and localized to a specific area of the chest.    The patient has been attempting to exercise by walking on a treadmill, but even light activity exacerbates her symptoms. She denies feeling anxious during these episodes.      ROS:  General: denies fever, denies chills, denies fatigue, denies weight gain, denies weight loss  Eyes: denies vision changes, denies redness, denies discharge  ENT: denies ear pain, denies nasal congestion, denies sore throat  Cardiovascular: complains of chest pain, denies palpitations, denies lower extremity edema  Respiratory: denies cough, complains of shortness of breath, complains of difficulty breathing  Gastrointestinal: denies abdominal pain, denies nausea, denies vomiting, denies diarrhea, denies constipation, denies blood in stool  Genitourinary: denies dysuria, denies hematuria, denies frequency  Musculoskeletal: denies joint  pain, denies muscle pain  Skin: denies rash, denies lesion  Neurological: denies headache, denies dizziness, denies numbness, denies tingling  Psychiatric: denies anxiety, denies depression, denies sleep difficulty            Physical Exam    General: In no acute distress.  Head: Normocephalic. Non traumatic.  Eyes: PERRLA. EOMs full. Conjunctivae clear. Fundi grossly normal.  Ears: EACs clear. TMs normal.  Nose: Mucosa pink. Mucosa moist. No obstruction.  Throat: Clear. No exudates. No lesions.  Neck: Supple. No masses. No thyromegaly. No bruits.  Chest: Lungs clear. No rales. No rhonchi. No wheezes.  Heart: RRR. No murmurs. No rubs. No gallops.  Abdomen: Soft. No tenderness. No masses. BS normal.  : Normal external genitalia. No lesions. No discharge. No hernias  noted.  Back: Normal curvature. No scoliosis. No tenderness.  Extremities: Warm. Well perfused. No upper extremity edema. No lower extremity edema. FROM. No deformities. No joint erythema.  Neuro: No focal deficits appreciated. Good muscle tone. Normal response to visual stimuli. Normal response to auditory stimuli.  Skin: Normal. No rashes. No lesions noted.            Current Outpatient Medications:     ibuprofen (ADVIL,MOTRIN) 200 MG tablet, Take 200 mg by mouth every 6 (six) hours as needed for Pain., Disp: , Rfl:     JANUVIA 50 mg Tab, Take 1 tablet (50 mg total) by mouth every morning., Disp: 90 tablet, Rfl: 3    lisdexamfetamine (VYVANSE) 20 MG capsule, Take 1 capsule (20 mg total) by mouth every morning., Disp: 30 capsule, Rfl: 0    resmetirom (REZDIFFRA) 80 mg Tab, Take 80 mg by mouth once daily., Disp: 90 tablet, Rfl: 3    risankizumab-rzaa (SKYRIZI) 150 mg/mL PnIj, Inject 150 mg every 12 weeks., Disp: 1 mL, Rfl: 3    semaglutide (OZEMPIC) 1 mg/dose (4 mg/3 mL), Inject 1 mg into the skin every 7 days., Disp: 3 mL, Rfl: 11    clindamycin (CLEOCIN T) 1 % Swab, Apply topically 2 (two) times daily. (Patient not taking: Reported on 9/13/2024),  "Disp: 60 each, Rfl: 2    mometasone (ELOCON) 0.1 % ointment, Apply topically once daily. (Patient not taking: Reported on 9/13/2024), Disp: 45 g, Rfl: 3            VITAL SIGNS:  Vitals:    09/13/24 1610   BP: 100/72   Pulse: 97   Resp: 18   Temp: 97.6 °F (36.4 °C)   TempSrc: Tympanic   SpO2: 98%   Weight: 73.2 kg (161 lb 6 oz)   Height: 4' 11" (1.499 m)       CURRENT BMI:   Body mass index is 32.59 kg/m².    LABS REVIEWED:    CBC:  Lab Results   Component Value Date    WBC 8.08 03/08/2024    RBC 4.90 03/08/2024    HGB 14.6 03/08/2024    HCT 45.4 03/08/2024    MCV 93 03/08/2024    MCH 29.8 03/08/2024    MCHC 32.2 03/08/2024    RDW 11.9 03/08/2024     03/08/2024    MPV 11.0 03/08/2024    GRAN 5.6 03/08/2024    GRAN 69.1 03/08/2024    LYMPH 1.8 03/08/2024    LYMPH 22.8 03/08/2024    MONO 0.5 03/08/2024    MONO 6.4 03/08/2024    EOS 0.1 03/08/2024    BASO 0.04 03/08/2024    EOSINOPHIL 0.7 03/08/2024    BASOPHIL 0.5 03/08/2024       CHEMISTRY:  Sodium   Date Value Ref Range Status   03/08/2024 137 136 - 145 mmol/L Final     Potassium   Date Value Ref Range Status   03/08/2024 4.4 3.5 - 5.1 mmol/L Final     Chloride   Date Value Ref Range Status   03/08/2024 102 95 - 110 mmol/L Final     CO2   Date Value Ref Range Status   03/08/2024 24 23 - 29 mmol/L Final     Glucose   Date Value Ref Range Status   03/08/2024 264 (H) 70 - 110 mg/dL Final     BUN   Date Value Ref Range Status   03/08/2024 11 6 - 20 mg/dL Final     Creatinine   Date Value Ref Range Status   03/08/2024 1.0 0.5 - 1.4 mg/dL Final     Calcium   Date Value Ref Range Status   03/08/2024 10.0 8.7 - 10.5 mg/dL Final     Total Protein   Date Value Ref Range Status   05/14/2024 8.0 6.0 - 8.4 g/dL Final     Albumin   Date Value Ref Range Status   05/14/2024 4.1 3.5 - 5.2 g/dL Final     Total Bilirubin   Date Value Ref Range Status   05/14/2024 0.3 0.1 - 1.0 mg/dL Final     Comment:     For infants and newborns, interpretation of results should be based  on " "gestational age, weight and in agreement with clinical  observations.    Premature Infant recommended reference ranges:  Up to 24 hours.............<8.0 mg/dL  Up to 48 hours............<12.0 mg/dL  3-5 days..................<15.0 mg/dL  6-29 days.................<15.0 mg/dL       Alkaline Phosphatase   Date Value Ref Range Status   05/14/2024 93 55 - 135 U/L Final     AST   Date Value Ref Range Status   05/14/2024 42 (H) 10 - 40 U/L Final     ALT   Date Value Ref Range Status   05/14/2024 86 (H) 10 - 44 U/L Final     Anion Gap   Date Value Ref Range Status   03/08/2024 11 8 - 16 mmol/L Final     eGFR   Date Value Ref Range Status   03/08/2024 >60.0 >60 mL/min/1.73 m^2 Final       LIPID PANEL:  Lab Results   Component Value Date    CHOL 169 04/11/2024     Lab Results   Component Value Date    TRIG 258 (H) 04/11/2024     Lab Results   Component Value Date    HDL 38 (L) 04/11/2024     Lab Results   Component Value Date    LDLCALC 79.4 04/11/2024       THYROID:  Lab Results   Component Value Date    TSH 1.854 10/11/2023       DIABETES:  Lab Results   Component Value Date    HGBA1C 8.1 (H) 04/11/2024     No results found for: "MICALBCREAT"    Assessment and Plan     Assessment & Plan    CARDIAC EVALUATION:  - Considering cardiac etiology for patient's shortness of breath and chest pain.  - Auscultation revealed normal heart sounds, but intermittent arrhythmia cannot be ruled out.  - Holter monitor ordered to evaluate for potential cardiac abnormalities.  - Referred to cardiology for Holter monitor placement and evaluation.    ACTIVITY RESTRICTION:  - Ms. Landaverde to discontinue exercise, including treadmill walking, until cardiac workup is complete.  - Ms. Landaverde to take it easy and avoid strenuous activities pending further evaluation.          1. Other chest pain  Comments:  likely secondary to deconditioning but due to random onset, will send to cardiology for holter monitor  Orders:  -     Ambulatory referral/consult " to Cardiology; Future; Expected date: 09/20/2024           No follow-ups on file.  25 of total time spent on the encounter, which includes face to face time and non-face to face time preparing to see the patient. This includes obtaining and/or reviewing separately obtained history, performing a medically appropriate examination and/or evaluation, and counseling and educating the patient/family/caregiver. Includes documenting clinical information in the electronic or other health record, independently interpreting results (not separately reported) and communicating results to the patient/family/caregiver, with care coordination (not separately reported). Medications, tests and/or procedures ordered as necessary along with referring and communicating with other health professionals (when not separately reported).      This note was generated with the assistance of ambient listening technology. Verbal consent was obtained by the patient and accompanying visitor(s) for the recording of patient appointment to facilitate this note. I attest to having reviewed and edited the generated note for accuracy, though some syntax or spelling errors may persist. Please contact the author of this note for any clarification.

## 2024-09-13 ENCOUNTER — OFFICE VISIT (OUTPATIENT)
Dept: FAMILY MEDICINE | Facility: CLINIC | Age: 34
End: 2024-09-13
Payer: COMMERCIAL

## 2024-09-13 VITALS
TEMPERATURE: 98 F | HEART RATE: 97 BPM | WEIGHT: 161.38 LBS | HEIGHT: 59 IN | DIASTOLIC BLOOD PRESSURE: 72 MMHG | OXYGEN SATURATION: 98 % | SYSTOLIC BLOOD PRESSURE: 100 MMHG | RESPIRATION RATE: 18 BRPM | BODY MASS INDEX: 32.53 KG/M2

## 2024-09-13 DIAGNOSIS — R07.89 OTHER CHEST PAIN: Primary | ICD-10-CM

## 2024-09-13 PROCEDURE — 99999 PR PBB SHADOW E&M-EST. PATIENT-LVL IV: CPT | Mod: PBBFAC,,, | Performed by: INTERNAL MEDICINE

## 2024-09-19 ENCOUNTER — TELEPHONE (OUTPATIENT)
Dept: HEPATOLOGY | Facility: CLINIC | Age: 34
End: 2024-09-19
Payer: COMMERCIAL

## 2024-09-19 NOTE — TELEPHONE ENCOUNTER
----- Message from Jeanna Romero sent at 9/19/2024 11:03 AM CDT -----  Contact: pt  Patient states that [facility] is needing PA.  Not sure who will get this sent (dr. Mendoza's patient)    [facility] gave her a number to get this taken care of  [phone] opt [phone]    Type:  RX Refill Request    Who Called:  pt  Refill or New Rx: refill  RX Name and Strength: resmetirom (REZDIFFRA) 80 mg Tab   How is the patient currently taking it? (ex. 1XDay):  Is this a 30 day or 90 day RX:  Preferred Pharmacy with phone number: [phone]  Local or Mail Order: mail  Ordering Provider: was Belinda Mendoza   Would the patient rather a call back or a response via [facility]?   Best Call Back Number:[phone]  Additional Information:  pt has only has 3 pills left     [address] [address]  Phone: [phone] Fax: [phone]

## 2024-09-19 NOTE — TELEPHONE ENCOUNTER
Contacted pharmacy for prior authorization after waiting for 25 minutes I was told that it was showing it's pending within 48 hours still under review with the medical director. Case number is [id number].

## 2024-09-20 ENCOUNTER — PATIENT MESSAGE (OUTPATIENT)
Dept: HEPATOLOGY | Facility: CLINIC | Age: 34
End: 2024-09-20
Payer: COMMERCIAL

## 2024-09-20 ENCOUNTER — TELEPHONE (OUTPATIENT)
Dept: HEPATOLOGY | Facility: CLINIC | Age: 34
End: 2024-09-20
Payer: COMMERCIAL

## 2024-09-20 DIAGNOSIS — Z00.00 ROUTINE HEALTH MAINTENANCE: ICD-10-CM

## 2024-09-20 DIAGNOSIS — Z76.89 ESTABLISHING CARE WITH NEW DOCTOR, ENCOUNTER FOR: Primary | ICD-10-CM

## 2024-09-20 NOTE — TELEPHONE ENCOUNTER
----- Message from JOHNEduard Belcher sent at 2024  9:41 AM CDT -----  [phone]  Would like to receive medical advice.    Would they like a call back or a response via [facility]:  call back the pt     Additional information:  Calling to speak with the officer regarding getting the denial for resmetirom (REZDIFFRA) 80 mg Tab   [[id number]] Appealed ref # [id number]. Caller states that the pt called them regarding why the meds weren't delivered but realized that the meds were denied. Caller states that she only has 2 tablets left for the meds that are for her liver and is asking that the office calls back and appeal the meds bc it looks as if it is . Appeal # is [id number].

## 2024-09-20 NOTE — TELEPHONE ENCOUNTER
Contacted patient and informed her that it was denied but marked as pending medical director's review per [facility] to which she agreed and voiced understanding.

## 2024-10-04 ENCOUNTER — TELEPHONE (OUTPATIENT)
Dept: HEPATOLOGY | Facility: CLINIC | Age: 34
End: 2024-10-04
Payer: COMMERCIAL

## 2024-10-04 NOTE — TELEPHONE ENCOUNTER
----- Message from Esteban sent at 10/4/2024  1:46 PM CDT -----  Contact: self  .Type:  Pharmacy Calling to Clarify an RX    Name of Caller:Nancy  Pharmacy Name:[facility]  Prescription Name:resmetirom (REZDIFFRA) 80 mg Tab  What do they need to clarify?:needing an PA  Best Call Back Number:[phone]  Additional Information:

## 2024-10-22 ENCOUNTER — TELEPHONE (OUTPATIENT)
Dept: HEPATOLOGY | Facility: CLINIC | Age: 34
End: 2024-10-22
Payer: COMMERCIAL

## 2024-10-22 ENCOUNTER — PATIENT MESSAGE (OUTPATIENT)
Dept: HEPATOLOGY | Facility: CLINIC | Age: 34
End: 2024-10-22
Payer: COMMERCIAL

## 2024-10-22 ENCOUNTER — PATIENT MESSAGE (OUTPATIENT)
Dept: RESEARCH | Facility: HOSPITAL | Age: 34
End: 2024-10-22
Payer: COMMERCIAL

## 2024-10-22 NOTE — TELEPHONE ENCOUNTER
Returned patient's call and informed her that I did send a peer to peer request to Dr. Mendoza and informed her that the medication is new to the market and samples aren't available to which she agreed and voiced understanding.

## 2024-10-22 NOTE — TELEPHONE ENCOUNTER
----- Message from Rebecca sent at 10/22/2024  1:21 PM CDT -----  The patient would like to know if he can get a sample of the medication   resmetirom (REZDIFFRA) 80 mg Tab   [[id number]] while it is being approved. Call back number is [phone]. Thx. EL

## 2024-10-25 ENCOUNTER — TELEPHONE (OUTPATIENT)
Dept: HEPATOLOGY | Facility: CLINIC | Age: 34
End: 2024-10-25
Payer: COMMERCIAL

## 2024-10-25 DIAGNOSIS — K75.81 NASH (NONALCOHOLIC STEATOHEPATITIS): ICD-10-CM

## 2024-10-25 RX ORDER — RESMETIROM 80 MG/1
80 TABLET, COATED ORAL DAILY
Qty: 30 TABLET | Refills: 5 | Status: SHIPPED | OUTPATIENT
Start: 2024-10-25

## 2024-10-25 NOTE — TELEPHONE ENCOUNTER
----- Message from Pharmacist Geovanna sent at 10/25/2024 11:39 AM CDT -----  Please send a prescription to [facility]. We will work through the denial as URGENT.  ----- Message -----  From: Belinda Mendoza MD  Sent: 10/25/2024  10:49 AM CDT  To: Geovanna Pérez, PharmD; [facility] Staff    I am unable to get through to try to do the peer to peer.  Will see if our specialty pharmacist can help in any way.  ----- Message -----  From: Saroj Trevino MA  Sent: 10/22/2024   1:21 PM CDT  To: Belinda Mendoza MD    Ольга and I have tried to get this approved. Patient is panicking because she knows that you're leaving this week. Can you contact them asap to do a peer to peer?  ----- Message -----  From: Rebecca Moeller  Sent: 10/22/2024   1:20 PM CDT  To: [facility] Staff    Christiano with [employer] called regarding the medication   resmetirom (REZDIFFRA) 80 mg Tab   [[id number]] stated the authorization was denied. A new request can be submitted or peer to peer. The number is [phone] option 2 and fax is [phone] or [phone] is the urgent number regarding the appeal. Thx.EL

## 2024-10-28 DIAGNOSIS — L40.9 PSORIASIS: ICD-10-CM

## 2024-10-30 ENCOUNTER — PATIENT MESSAGE (OUTPATIENT)
Dept: DERMATOLOGY | Facility: CLINIC | Age: 34
End: 2024-10-30
Payer: COMMERCIAL

## 2024-10-30 RX ORDER — RISANKIZUMAB-RZAA 150 MG/ML
INJECTION SUBCUTANEOUS
Qty: 1 ML | Refills: 0 | Status: SHIPPED | OUTPATIENT
Start: 2024-10-30

## 2024-11-18 ENCOUNTER — PATIENT MESSAGE (OUTPATIENT)
Dept: GASTROENTEROLOGY | Facility: CLINIC | Age: 34
End: 2024-11-18
Payer: COMMERCIAL

## 2024-11-26 ENCOUNTER — TELEPHONE (OUTPATIENT)
Dept: HEPATOLOGY | Facility: CLINIC | Age: 34
End: 2024-11-26
Payer: COMMERCIAL

## 2024-11-26 NOTE — TELEPHONE ENCOUNTER
----- Message from Keily sent at 11/26/2024 10:44 AM CST -----  Contact: Lashanda [phone]  Lashanda calling from [employer] in regards to an appeal that was sent over for Rezdiffra.  She received office notes but they need to know if pt drinks alcohol before than can approve the medication.  Please call back to advise.  Please leave a vm.  It is secure.

## 2024-12-13 DIAGNOSIS — K75.81 NASH (NONALCOHOLIC STEATOHEPATITIS): ICD-10-CM

## 2024-12-13 RX ORDER — RESMETIROM 80 MG/1
80 TABLET, COATED ORAL DAILY
Qty: 30 TABLET | Refills: 5 | Status: CANCELLED | OUTPATIENT
Start: 2024-12-13

## 2024-12-17 DIAGNOSIS — K75.81 NASH (NONALCOHOLIC STEATOHEPATITIS): Primary | ICD-10-CM

## 2024-12-23 ENCOUNTER — LAB VISIT (OUTPATIENT)
Dept: LAB | Facility: HOSPITAL | Age: 34
End: 2024-12-23
Attending: NURSE PRACTITIONER
Payer: COMMERCIAL

## 2024-12-23 DIAGNOSIS — K75.81 NASH (NONALCOHOLIC STEATOHEPATITIS): ICD-10-CM

## 2024-12-23 DIAGNOSIS — R79.89 ELEVATED LFTS: ICD-10-CM

## 2024-12-23 LAB
ALBUMIN SERPL BCP-MCNC: 4.3 G/DL (ref 3.5–5.2)
ALP SERPL-CCNC: 111 U/L (ref 40–150)
ALT SERPL W/O P-5'-P-CCNC: 71 U/L (ref 10–44)
ANION GAP SERPL CALC-SCNC: 9 MMOL/L (ref 8–16)
AST SERPL-CCNC: 44 U/L (ref 10–40)
BILIRUB SERPL-MCNC: 0.6 MG/DL (ref 0.1–1)
BUN SERPL-MCNC: 13 MG/DL (ref 6–20)
CALCIUM SERPL-MCNC: 9.7 MG/DL (ref 8.7–10.5)
CERULOPLASMIN SERPL-MCNC: 28 MG/DL (ref 15–45)
CHLORIDE SERPL-SCNC: 103 MMOL/L (ref 95–110)
CO2 SERPL-SCNC: 24 MMOL/L (ref 23–29)
CREAT SERPL-MCNC: 0.8 MG/DL (ref 0.5–1.4)
EST. GFR  (NO RACE VARIABLE): >60 ML/MIN/1.73 M^2
GLUCOSE SERPL-MCNC: 210 MG/DL (ref 70–110)
HBV SURFACE AB SER-ACNC: 12.06 MIU/ML
HBV SURFACE AB SER-ACNC: REACTIVE M[IU]/ML
POTASSIUM SERPL-SCNC: 4.1 MMOL/L (ref 3.5–5.1)
PROT SERPL-MCNC: 8.3 G/DL (ref 6–8.4)
SODIUM SERPL-SCNC: 136 MMOL/L (ref 136–145)

## 2024-12-23 PROCEDURE — 80053 COMPREHEN METABOLIC PANEL: CPT | Performed by: NURSE PRACTITIONER

## 2024-12-23 PROCEDURE — 36415 COLL VENOUS BLD VENIPUNCTURE: CPT | Mod: PO | Performed by: INTERNAL MEDICINE

## 2024-12-23 PROCEDURE — 86706 HEP B SURFACE ANTIBODY: CPT | Performed by: INTERNAL MEDICINE

## 2024-12-23 PROCEDURE — 82390 ASSAY OF CERULOPLASMIN: CPT | Performed by: NURSE PRACTITIONER

## 2024-12-24 ENCOUNTER — PATIENT MESSAGE (OUTPATIENT)
Dept: GASTROENTEROLOGY | Facility: CLINIC | Age: 34
End: 2024-12-24
Payer: COMMERCIAL

## 2024-12-27 ENCOUNTER — OFFICE VISIT (OUTPATIENT)
Dept: HEPATOLOGY | Facility: CLINIC | Age: 34
End: 2024-12-27
Payer: COMMERCIAL

## 2024-12-27 VITALS — HEIGHT: 59 IN | BODY MASS INDEX: 33.26 KG/M2 | WEIGHT: 165 LBS

## 2024-12-27 DIAGNOSIS — K74.00 LIVER FIBROSIS: ICD-10-CM

## 2024-12-27 DIAGNOSIS — R93.2 ABNORMAL FINDING ON IMAGING OF LIVER: ICD-10-CM

## 2024-12-27 DIAGNOSIS — K75.81 NASH (NONALCOHOLIC STEATOHEPATITIS): Primary | ICD-10-CM

## 2024-12-27 DIAGNOSIS — R74.8 ELEVATED LIVER ENZYMES: ICD-10-CM

## 2024-12-27 DIAGNOSIS — K76.0 HEPATIC STEATOSIS: ICD-10-CM

## 2024-12-27 PROBLEM — R73.03 PREDIABETES: Status: RESOLVED | Noted: 2024-02-15 | Resolved: 2024-12-27

## 2024-12-27 NOTE — PROGRESS NOTES
Hepatology Note    PATIENT: Santiago Landaverde  MRN: [id number]  DATE: 12/27/2024    Urgency of review: non-urgent  Referring provider: No ref. provider found    Diagnosis:   1. TIM (nonalcoholic steatohepatitis)    2. Liver fibrosis    3. Abnormal finding on imaging of liver    4. Hepatic steatosis    5. Elevated liver enzymes        Chief complaint:   Chief Complaint   Patient presents with    TIM       Subjective:    Initial History: Santiago Landaverde is a 34 y.o. female who was referred to Hepatology Clinic for consultation of liver fibrosis.  The patient reports medical history of psoriasis, ADHD, fatty liver, elevated LFTs.      Face to Face time with patient: 25 minutes  35 minutes of total time spent on the encounter, which includes face to face time and non-face to face time preparing to see the patient (eg, review of tests), Obtaining and/or reviewing separately obtained history, Documenting clinical information in the electronic or other health record, Independently interpreting results (not separately reported) and communicating results to the patient/family/caregiver, or Care coordination (not separately reported).            Each patient to whom he or she provides medical services by telemedicine is:  (1) informed of the relationship between the physician and patient and the respective role of any other health care provider with respect to management of the patient; and (2) notified that he or she may decline to receive medical services by telemedicine and may withdraw from such care at any time.     12/27/2024   Liver enzymes improved on 12/23/24 but still are elevated. The patient reported that she did not have any weight loss in the past 6 months.     The patient reported that she stopped Rezdiffra 80 mg daily in 8/2024 due to insurance issue.     She just received Rezdiffra and she will re-start Rezdiffra 80 mg daily.    She reported that sometimes Rezdiffra cause abdominal discomfort.     She denied  recent hematemesis, coffee ground emesis, melena, hematochezia, jaundice, confusion, LE edema, abdominal distension.     She reported that She stopped drinking alcoholic drinks around 2021.    She reported no history of autoimmune disease, hypothyroidism, IBD, cancer, IV drug, blood transfusion, HIV, viral hepatitis, hemochromatosis, nor tattoo.     She reported no family history of autoimmune disease, hypothyroidism, IBD, nor hemochromatosis.      The patient reported that father had no history of cancer, mother had no history of cancer.    FIB-4 Calculation: 0.57 at 3/8/2024 11:08 AM  Calculated from:  SGOT/AST: 40 U/L at 3/8/2024 11:08 AM  SGPT/ALT: 73 U/L at 3/8/2024 11:08 AM  Platelets: 281 K/uL at 3/8/2024 11:08 AM  Age: 33 years       FIB-4 below 1.30 is considered as low-risk for advanced fibrosis  FIB-4 over 2.67 is considered as high-risk for advanced fibrosis  FIB-4 values between 1.30 and 2.67 are considered as intermediate-risk of advanced fibrosis for ages 36-64.     For ages > 64 the cut-off for low-risk goes to < 2.  This is a screening tool and clinical judgement should be used in the interpretation of these results.      Prior Relevant History:    She  denies hepatotoxic medication.    Review of systems:  A review of 12+ systems was conducted with pertinent positive and negative findings documented in HPI with all other systems reviewed and negative.      PFSH:  Past medical, family, and social history reviewed as documented in chart with pertinent positive medical, family, and social history detailed in HPI.    Past Medical History:   Past Medical History:   Diagnosis Date    Hyperlipidemia     Insulin resistance        Past Surgical HIstory: History reviewed. No pertinent surgical history.    Family History:   Family History   Problem Relation Name Age of Onset    Hemochromatosis Neg Hx       She has no known family history of liver disease.     Social History:  reports that she has quit smoking.  Her smoking use included cigarettes. She has never used smokeless tobacco. She reports that she does not currently use alcohol. She reports that she does not use drugs.    She has no significant history of alcohol consumption.    She denies history of IV drug use/Tattoo.      Allergies:  Review of patient's allergies indicates:  No Known Allergies    Medications:  Current Outpatient Medications   Medication Sig Dispense Refill    ibuprofen (ADVIL,MOTRIN) 200 MG tablet Take 200 mg by mouth every 6 (six) hours as needed for Pain.      JANUVIA 50 mg Tab Take 1 tablet (50 mg total) by mouth every morning. 90 tablet 3    lisdexamfetamine (VYVANSE) 20 MG capsule Take 1 capsule (20 mg total) by mouth every morning. 30 capsule 0    risankizumab-rzaa (SKYRIZI) 150 mg/mL PnIj INJECT 150 MG (1 ML) UNDER THE SKIN ON WEEK 4, THEN EVERY 12 WEEKS THEREAFTER 1 mL 0    semaglutide (OZEMPIC) 1 mg/dose (4 mg/3 mL) Inject 1 mg into the skin every 7 days. 3 mL 11    clindamycin (CLEOCIN T) 1 % Swab Apply topically 2 (two) times daily. (Patient not taking: Reported on 12/27/2024) 60 each 2    mometasone (ELOCON) 0.1 % ointment Apply topically once daily. (Patient not taking: Reported on 12/27/2024) 45 g 3    resmetirom (REZDIFFRA) 80 mg Tab Take 80 mg by mouth once daily. (Patient not taking: Reported on 12/27/2024) 30 tablet 5     No current facility-administered medications for this visit.       Review of Systems   Constitutional:  Negative for activity change, chills and fever.   HENT:  Negative for facial swelling.    Eyes:  Negative for redness.   Respiratory:  Negative for apnea, chest tightness and wheezing.    Cardiovascular:  Negative for chest pain and leg swelling.        HLD   Gastrointestinal:  Negative for abdominal distention, abdominal pain, anal bleeding, blood in stool, nausea and vomiting.        Liver fibrosis. Elevated LFTs.   Endocrine:        DM.   Genitourinary:  Negative for hematuria.   Musculoskeletal:   "Negative for gait problem.   Skin:  Negative for color change and rash.        Psoriasis.   Allergic/Immunologic: Negative for food allergies.        Psoriasis.   Neurological:  Negative for dizziness, tremors, seizures, facial asymmetry and speech difficulty.   Hematological:  Does not bruise/bleed easily.   Psychiatric/Behavioral:  Negative for behavioral problems and confusion.         ADHD.       Objective:      Vitals:   Vitals:    12/27/24 1513   Weight: 74.8 kg (165 lb)   Height: 4' 11" (1.499 m)       Physical Exam  Constitutional:       Appearance: She is obese.   HENT:      Head: Normocephalic and atraumatic.      Nose: No congestion.      Mouth/Throat:      Pharynx: Oropharynx is clear.   Eyes:      General: No scleral icterus.        Right eye: No discharge.         Left eye: No discharge.   Cardiovascular:      Rate and Rhythm: Normal rate.   Pulmonary:      Effort: No respiratory distress.   Musculoskeletal:      Cervical back: Normal range of motion.   Skin:     Coloration: Skin is not jaundiced.   Neurological:      Mental Status: She is oriented to person, place, and time. Mental status is at baseline.      Motor: No weakness.      Coordination: Coordination normal.      Comments: No asterixis.   Psychiatric:         Mood and Affect: Mood normal.         Behavior: Behavior normal.         Laboratory Data:  Lab Visit on 12/23/2024   Component Date Value Ref Range Status    Hep B S Ab 12/23/2024 12.06  mIU/mL Final    Hep B S Ab 12/23/2024 Reactive   Final    Individual is considered immune to HBV infection.    Ceruloplasmin 12/23/2024 28.0  15.0 - 45.0 mg/dL Final    Sodium 12/23/2024 136  136 - 145 mmol/L Final    Potassium 12/23/2024 4.1  3.5 - 5.1 mmol/L Final    Chloride 12/23/2024 103  95 - 110 mmol/L Final    CO2 12/23/2024 24  23 - 29 mmol/L Final    Glucose 12/23/2024 210 (H)  70 - 110 mg/dL Final    BUN 12/23/2024 13  6 - 20 mg/dL Final    Creatinine 12/23/2024 0.8  0.5 - 1.4 mg/dL Final    " "Calcium 12/23/2024 9.7  8.7 - 10.5 mg/dL Final    Total Protein 12/23/2024 8.3  6.0 - 8.4 g/dL Final    Albumin 12/23/2024 4.3  3.5 - 5.2 g/dL Final    Total Bilirubin 12/23/2024 0.6  0.1 - 1.0 mg/dL Final    Comment: For infants and newborns, interpretation of results should be based  on gestational age, weight and in agreement with clinical  observations.    Premature Infant recommended reference ranges:  Up to 24 hours.............<8.0 mg/dL  Up to 48 hours............<12.0 mg/dL  3-5 days..................<15.0 mg/dL  6-29 days.................<15.0 mg/dL      Alkaline Phosphatase 12/23/2024 111  40 - 150 U/L Final    AST 12/23/2024 44 (H)  10 - 40 U/L Final    ALT 12/23/2024 71 (H)  10 - 44 U/L Final    eGFR 12/23/2024 >60.0  >60 mL/min/1.73 m^2 Final    Anion Gap 12/23/2024 9  8 - 16 mmol/L Final       Lab Results   Component Value Date    INR 1.0 11/29/2023       Lab Results   Component Value Date    SMOOTHMUSCAB Negative 1:40 11/29/2023       Lab Results   Component Value Date    IRON 69 11/29/2023    TIBC 444 11/29/2023    FERRITIN 315 (H) 11/29/2023    FESATURATED 16 (L) 11/29/2023       Lab Results   Component Value Date    HEPAIGG Non-reactive 11/29/2023    HEPAIGM Non-reactive 11/08/2023    HEPBIGM Non-reactive 11/08/2023    HEPBSAB 12.06 12/23/2024    HEPBSAB Reactive 12/23/2024    HEPBSAG Non-reactive 11/08/2023    HEPBCAB Non-reactive 11/29/2023    HEPCAB Non-reactive 11/08/2023       Lab Results   Component Value Date    TSH 1.854 10/11/2023       Lab Results   Component Value Date    TSH 1.854 10/11/2023     03/08/2024    ALKPHOS 111 12/23/2024    AST 44 (H) 12/23/2024    ALT 71 (H) 12/23/2024    BILITOT 0.6 12/23/2024    INR 1.0 11/29/2023    ANASCREEN Positive (A) 11/29/2023    SMOOTHMUSCAB Negative 1:40 11/29/2023    AMAIFA Negative 1:40 11/29/2023    IGGSERUM 1383 11/29/2023    TTGIGA 1.5 11/29/2023    U0KQPPOURU 109 11/29/2023     No results found for: "ABORH"    No results found for: " ""RETIC", "LDH", "HAPTOGLOBIN", "UGTFGSUMMARY"    Lab Results   Component Value Date    ANASCREEN Positive (A) 11/29/2023    SMOOTHMUSCAB Negative 1:40 11/29/2023    AMAIFA Negative 1:40 11/29/2023    HEPAIGG Non-reactive 11/29/2023    HEPBCAB Non-reactive 11/29/2023    HEPBSAB 12.06 12/23/2024    HEPBSAB Reactive 12/23/2024    S3VSRUHQZH 109 11/29/2023     Lab Results   Component Value Date    CERULOPLSM 28.0 12/23/2024        Lab Results   Component Value Date    HGBA1C 8.1 (H) 04/11/2024     Lab Results   Component Value Date    CHOL 169 04/11/2024     Lab Results   Component Value Date    HDL 38 (L) 04/11/2024     Lab Results   Component Value Date    LDLCALC 79.4 04/11/2024     Lab Results   Component Value Date    TRIG 258 (H) 04/11/2024     Lab Results   Component Value Date    CHOLHDL 22.5 04/11/2024         I personally reviewed imaging studies and outside records..      Assessment:       1. TIM (nonalcoholic steatohepatitis)    2. Liver fibrosis    3. Abnormal finding on imaging of liver    4. Hepatic steatosis    5. Elevated liver enzymes           Plan:     Problem List Items Addressed This Visit       TIM (nonalcoholic steatohepatitis) - Primary    Relevant Orders    FibroScan (Vibration Controlled Transient Elastography)    Comprehensive Metabolic Panel     Other Visit Diagnoses       Liver fibrosis        Relevant Orders    FibroScan (Vibration Controlled Transient Elastography)    Comprehensive Metabolic Panel    AFP Tumor Marker    US Abdomen Complete    Abnormal finding on imaging of liver        Relevant Orders    FibroScan (Vibration Controlled Transient Elastography)    Comprehensive Metabolic Panel    AFP Tumor Marker    US Abdomen Complete    Hepatic steatosis        Relevant Orders    FibroScan (Vibration Controlled Transient Elastography)    Comprehensive Metabolic Panel    Elevated liver enzymes                Fayenaila Kaiser" was seen today for tim.    Diagnoses and all orders for this " visit:    TIM (nonalcoholic steatohepatitis)  -     FibroScan (Vibration Controlled Transient Elastography); Standing  -     Comprehensive Metabolic Panel; Standing    Liver fibrosis  -     FibroScan (Vibration Controlled Transient Elastography); Standing  -     Comprehensive Metabolic Panel; Standing  -     AFP Tumor Marker; Standing  -     US Abdomen Complete; Standing    Abnormal finding on imaging of liver  -     FibroScan (Vibration Controlled Transient Elastography); Standing  -     Comprehensive Metabolic Panel; Standing  -     AFP Tumor Marker; Standing  -     US Abdomen Complete; Standing    Hepatic steatosis  -     FibroScan (Vibration Controlled Transient Elastography); Standing  -     Comprehensive Metabolic Panel; Standing    Elevated liver enzymes        Recommendations  Per liver Tox review, Risankizumab is generally well tolerated and is associated with a low rate of serum aminotransferase elevations during therapy, but has not been linked to instances of clinically apparent liver injury.     CMP in 1 month then q 3 months.   US abd. AFP for HCC screening Q 6 months due to advance fibrosis.  Fibroscan q 6 months.  Recommend PCP to monitor A1c Q 3 months    Hep A Ab negative, hep B S Ab positive.  Recommend to have hep A vaccines. The patient can get vaccines at [place] or [facility]. [facility] do not need prescription for hep A or B vaccines.    I recommended a more pragmatic approach to the patient's medical problems which would include the following:  - life-style modifications: weight loss, daily exercise (30 mins of HIIT or cardio), low carb/low fat diet.  - Educated patient on spectrum of fatty liver disease and potential for cirrhosis if MASH present.  - Explored dietary habits and discussed dietary recommendations.  - Low calorie, low carbohydrate, high protein mediterranean diet with goal of loosing >3-5% to improve steatosis and >7-10% to improve histological  changes  Recommend alcohol abstinence.      Return to clinic in 6 months.    I have sent communication to the referring physician and/or primary care provider.      Time Statement  A total 35 minutes spent includes time preparing to see patient, reviewing  diagnostic studies and records, direct face-to-face visit, completing orders, medications, reconciliation, prescription management, and care coordination.    We discussed in depth the nature of the patient's disease, the management plan in details. I have provided the patient with an opportunity to ask questions and have all questions answered.    Discussed with the patient that it is likely that the patient will see results before myself or my nurse are able to view them and report results due to the Cures Act passed 4/1/21. Results will be sent immediately to the patient who are enrolled in the patient portal. If results come through after business hours or on weekend, we will not see them until the next business day that we are in the office. If resulted during the business day, we will likely not be able to review them until after completing all patient visits in office that day.     Guillermo Niño, RAYP  [facility]

## 2025-01-14 DIAGNOSIS — E11.9 TYPE 2 DIABETES MELLITUS WITHOUT COMPLICATION, WITHOUT LONG-TERM CURRENT USE OF INSULIN: ICD-10-CM

## 2025-01-14 NOTE — TELEPHONE ENCOUNTER
Care Due:                  Date            Visit Type   Department     Provider  --------------------------------------------------------------------------------                                MYCHART                              FOLLOWUP/OF  NEIL FAMILY  Last Visit: 09-      FICE VISIT   [facility]       Mary Gonzalez  Next Visit: None Scheduled  None         None Found                                                            Last  Test          Frequency    Reason                     Performed    Due Date  --------------------------------------------------------------------------------    HBA1C.......  6 months...  marium BHATTI.....  04-   10-    [facility] Embedded Care Due Messages. Reference number: [id number].   1/14/2025 7:07:37 AM CST

## 2025-01-14 NOTE — TELEPHONE ENCOUNTER
Refill Routing Note   Medication(s) are not appropriate for processing by [facility] for the following reason(s):        Required labs outdated    [facility] action(s):  Defer     Requires labs : Yes             Appointments  past 12m or future 3m with PCP    Date Provider   Last Visit   9/13/2024 Mary Gonzalez, DO   Next Visit   Visit date not found Mary Gonzalez, DO   ED visits in past 90 days: 0        Note composed:8:14 AM 01/14/2025

## 2025-01-15 ENCOUNTER — TELEPHONE (OUTPATIENT)
Dept: FAMILY MEDICINE | Facility: CLINIC | Age: 35
End: 2025-01-15
Payer: COMMERCIAL

## 2025-01-15 RX ORDER — SITAGLIPTIN 50 MG/1
50 TABLET, FILM COATED ORAL EVERY MORNING
Qty: 30 TABLET | Refills: 0 | Status: SHIPPED | OUTPATIENT
Start: 2025-01-15

## 2025-01-24 RX ORDER — SEMAGLUTIDE 0.68 MG/ML
INJECTION, SOLUTION SUBCUTANEOUS
Qty: 3 ML | Refills: 0 | OUTPATIENT
Start: 2025-01-24

## 2025-01-24 NOTE — TELEPHONE ENCOUNTER
Refill Decision Note   Santiago Landaverde  is requesting a refill authorization.  Brief Assessment and Rationale for Refill:  Quick Discontinue     Medication Therapy Plan:  prescription was discontinued on 4/11/2024 by Mary Gonzalez DO for the following reason: Dose adjustment.      Comments:     Note composed:11:10 AM 01/24/2025

## 2025-01-24 NOTE — TELEPHONE ENCOUNTER
No care due was identified.  [facility] Embedded Care Due Messages. Reference number: [id number].   1/24/2025 7:11:08 AM CST

## 2025-01-30 ENCOUNTER — PATIENT MESSAGE (OUTPATIENT)
Dept: FAMILY MEDICINE | Facility: CLINIC | Age: 35
End: 2025-01-30
Payer: COMMERCIAL

## 2025-02-07 DIAGNOSIS — E11.9 TYPE 2 DIABETES MELLITUS WITHOUT COMPLICATION, WITHOUT LONG-TERM CURRENT USE OF INSULIN: ICD-10-CM

## 2025-02-07 RX ORDER — SEMAGLUTIDE 1.34 MG/ML
1 INJECTION, SOLUTION SUBCUTANEOUS
Qty: 3 ML | Refills: 11 | Status: SHIPPED | OUTPATIENT
Start: 2025-02-07 | End: 2026-02-07

## 2025-02-07 NOTE — TELEPHONE ENCOUNTER
No care due was identified.  [facility] Embedded Care Due Messages. Reference number: [id number].   2/07/2025 8:16:31 AM CST

## 2025-02-24 DIAGNOSIS — E11.9 TYPE 2 DIABETES MELLITUS WITHOUT COMPLICATION, WITHOUT LONG-TERM CURRENT USE OF INSULIN: ICD-10-CM

## 2025-02-24 RX ORDER — SITAGLIPTIN 50 MG/1
50 TABLET, FILM COATED ORAL EVERY MORNING
Qty: 30 TABLET | Refills: 0 | OUTPATIENT
Start: 2025-02-24

## 2025-02-24 NOTE — TELEPHONE ENCOUNTER
Refill Routing Note   Medication(s) are not appropriate for processing by [facility] for the following reason(s):        Required labs outdated: A1C    [facility] action(s):  Defer             Appointments  past 12m or future 3m with PCP    Date Provider   Last Visit   9/13/2024 Mary Gonzalez, DO   Next Visit   Visit date not found Mary Gonzalez, DO   ED visits in past 90 days: 0        Note composed:12:44 PM 02/24/2025

## 2025-02-24 NOTE — TELEPHONE ENCOUNTER
No care due was identified.  [facility] Embedded Care Due Messages. Reference number: [id number].   2/24/2025 7:05:45 AM CST

## 2025-03-05 ENCOUNTER — TELEPHONE (OUTPATIENT)
Dept: HEPATOLOGY | Facility: CLINIC | Age: 35
End: 2025-03-05
Payer: COMMERCIAL

## 2025-03-05 NOTE — TELEPHONE ENCOUNTER
----- Message from Rebecca sent at 3/5/2025  2:30 PM CST -----  .Type:  Reschedule Appointment RequestName of Caller:.Santiago Landaverde When is the first available appointment?03/06/2025Symptoms: fibroscanBest Call Back Number:[phone] Information: Patient would like to reschedule the firbroscan to a different date. Thx.EL

## 2025-03-06 ENCOUNTER — PROCEDURE VISIT (OUTPATIENT)
Dept: HEPATOLOGY | Facility: CLINIC | Age: 35
End: 2025-03-06
Payer: COMMERCIAL

## 2025-03-06 ENCOUNTER — TELEPHONE (OUTPATIENT)
Dept: HEPATOLOGY | Facility: CLINIC | Age: 35
End: 2025-03-06
Payer: COMMERCIAL

## 2025-03-06 VITALS — WEIGHT: 165.38 LBS | BODY MASS INDEX: 33.34 KG/M2 | HEIGHT: 59 IN

## 2025-03-06 DIAGNOSIS — K76.0 HEPATIC STEATOSIS: ICD-10-CM

## 2025-03-06 DIAGNOSIS — K74.00 LIVER FIBROSIS: ICD-10-CM

## 2025-03-06 DIAGNOSIS — R93.2 ABNORMAL FINDING ON IMAGING OF LIVER: ICD-10-CM

## 2025-03-06 DIAGNOSIS — K75.81 NASH (NONALCOHOLIC STEATOHEPATITIS): ICD-10-CM

## 2025-03-06 PROCEDURE — 91200 LIVER ELASTOGRAPHY: CPT | Mod: S$GLB,,, | Performed by: INTERNAL MEDICINE

## 2025-03-06 NOTE — TELEPHONE ENCOUNTER
----- Message from Zixi sent at 3/4/2025 10:38 AM CST -----  Contact: Wife  Type:  Sooner Apoointment RequestCaller is requesting a sooner appointment.  Caller declined first available appointment listed below.  Caller will not accept being placed on the waitlist and is requesting a message be sent to doctor.Name of Caller:Neetu Wife When is the first available appointment?3/6/25Symptoms:scanWould the patient rather a call back or a response via [facility]? Call [facility] Call Back Number:[phone] Information: the patient wife called to Tuesday and Thursday are not good days unless it really early in the morning. Also 3/12 does not work that day.

## 2025-03-06 NOTE — PROCEDURES
FibroScan (Vibration Controlled Transient Elastography)    Date/Time: 3/6/2025 2:00 PM    Performed by: Benny Rich MD  Authorized by: Guillermo Niño FNP    Diagnosis:  NAFLD    Probe:  M    Universal Protocol: Patient's identity, procedure and site were verified, confirmatory pause was performed.  Discussed procedure including risks and potential complications.  Questions answered.  Patient verbalizes understanding and wishes to proceed with VCTE.     Procedure: After providing explanations of the procedure, patient was placed in the supine position with right arm in maximum abduction to allow optimal exposure of right lateral abdomen.  Patient was briefly assessed, Testing was performed in the mid-axillary location, 50Hz Shear Wave pulses were applied and the resulting Shear Wave and Propagation Speed detected with a 3.5 MHz ultrasonic signal, using the FibroScan probe, Skin to liver capsule distance and liver parenchyma were accessed during the entire examination with the FibroScan probe, Patient was instructed to breathe normally and to abstain from sudden movements during the procedure, allowing for random measurements of liver stiffness. At least 10 Shear Waves were produced, Individual measurements of each Shear Wave were calculated.  Patient tolerated the procedure well with no complications.  Meets discharge criteria as was dismissed.  Rates pain 0 out of 10.  Patient will follow up with ordering provider to review results.    Findings  Median liver stiffness score:  10.9  CAP Reading: dB/m:  324    IQR/med %:  15  Interpretation  Fibrosis interpretation is based on medial liver stiffness - Kilopascal (kPa).    Fibrosis Stage:  F3  Steatosis interpretation is based on controlled attenuation parameter - (dB/m).    Steatosis Grade:  S2  Comments/Plan:  Moderate steatosis and bridging fibrosis

## 2025-03-06 NOTE — TELEPHONE ENCOUNTER
Attempted to contact patient at [phone] with no answer. Left voicemail message for patient to return call.

## 2025-03-12 ENCOUNTER — TELEPHONE (OUTPATIENT)
Dept: DERMATOLOGY | Facility: CLINIC | Age: 35
End: 2025-03-12

## 2025-03-12 ENCOUNTER — OFFICE VISIT (OUTPATIENT)
Dept: DERMATOLOGY | Facility: CLINIC | Age: 35
End: 2025-03-12
Payer: COMMERCIAL

## 2025-03-12 DIAGNOSIS — L40.9 PSORIASIS: Primary | ICD-10-CM

## 2025-03-12 RX ORDER — RISANKIZUMAB-RZAA 150 MG/ML
INJECTION SUBCUTANEOUS
Qty: 1 ML | Refills: 4 | Status: SHIPPED | OUTPATIENT
Start: 2025-03-12

## 2025-03-12 NOTE — PROGRESS NOTES
The patient location is: home  The chief complaint leading to consultation is: follow-up psoriasis on Skyrizi    Visit type: audiovisual    Face to Face time with patient: 10mins  01 minutes of total time spent on the encounter, which includes face to face time and non-face to face time preparing to see the patient (eg, review of tests), Obtaining and/or reviewing separately obtained history, Documenting clinical information in the electronic or other health record, Independently interpreting results (not separately reported) and communicating results to the patient/family/caregiver, or Care coordination (not separately reported).         Each patient to whom he or she provides medical services by telemedicine is:  (1) informed of the relationship between the physician and patient and the respective role of any other health care provider with respect to management of the patient; and (2) notified that he or she may decline to receive medical services by telemedicine and may withdraw from such care at any time.    Notes: Follow-up psoriasis on Skyrizi, last seen on 3/8/24. Continues to do well with clear skin and no major flares. Tolerating medication well and happy with results. No other concerning rash or skin lesions.     ROS: Otherwise negative    PE: const/neuro - AAOx3, NAD           Skin - skin clear with no psoriasis plaques    A/P: 1) Psoriasis - Doing well with clear skin on Skyrizi; will continue at this time. TB test today. Refills sent.

## 2025-03-12 NOTE — TELEPHONE ENCOUNTER
Outreach made to patient to schedule lab appointment. No answer, left message to return call.    ----- Message from Adiel Laughlin MD sent at 3/12/2025  1:49 PM CDT -----  Can we schedule this patient's Quant gold TB test at [facility]? This is for her [place]ai. Okay to follow-up in 1 year. Thanks.

## 2025-03-12 NOTE — TELEPHONE ENCOUNTER
Return patient call. No answer, left message to return my call.    ----- Message from Nurse Collazo sent at 3/12/2025  2:32 PM CDT -----    ----- Message -----  From: Adiel Laughlin MD  Sent: 3/12/2025   1:49 PM CDT  To: Truman Chun Staff    Can we schedule this patient's Quant gold TB test at [facility]? This is for her [place]. Okay to follow-up in 1 year. Thanks.

## 2025-03-14 NOTE — PROGRESS NOTES
Patient ID: Santiago Landaverde is a 34 y.o. female.    Chief Complaint: diabetes care and Annual Exam      HPI  History of Present Illness    - Ms. Landaverde presents for an annual wellness visit and to discuss refills for Januvia and options for continuous glucose monitoring.    Ms. Landaverde is here for her annual follow-up and needs refills for her Januvia medication. She wants to monitor her blood sugar more effectively and inquires about options for continuous glucose monitoring (CGM), expressing a preference for avoiding finger pricks. She shows interest in learning about the Dexcom G7 CGM system, asking about the 10-day sensor duration and application process.      ROS:  General: denies fever, denies chills, denies fatigue, denies weight gain, denies weight loss  Eyes: denies vision changes, denies redness, denies discharge  ENT: denies ear pain, denies nasal congestion, denies sore throat  Cardiovascular: denies chest pain, denies palpitations, denies lower extremity edema  Respiratory: denies cough, denies shortness of breath  Gastrointestinal: denies abdominal pain, denies nausea, denies vomiting, denies diarrhea, denies constipation, denies blood in stool  Genitourinary: denies dysuria, denies hematuria, denies frequency  Musculoskeletal: denies joint pain, denies muscle pain  Skin: denies rash, denies lesion  Neurological: denies headache, denies dizziness, denies numbness, denies tingling  Psychiatric: denies anxiety, denies depression, denies sleep difficulty                   Objective     Current Medications[1]     Physical Exam    General: In no acute distress.  Head: Normocephalic. Non traumatic.  Eyes: PERRLA. EOMs full. Conjunctivae clear. Fundi grossly normal.  Ears: EACs clear. TMs normal.  Nose: Mucosa pink. Mucosa moist. No obstruction.  Throat: Clear. No exudates. No lesions.  Neck: Supple. No masses. No thyromegaly. No bruits.  Chest: Lungs clear. No rales. No rhonchi. No wheezes.  Heart: RRR. No  "murmurs. No rubs. No gallops.  Abdomen: Soft. No tenderness. No masses. BS normal.  : Normal external genitalia. No lesions. No discharge. No hernias  noted.  Back: Normal curvature. No scoliosis. No tenderness.  Extremities: Warm. Well perfused. No upper extremity edema. No lower extremity edema. FROM. No deformities. No joint erythema.  Neuro: No focal deficits appreciated. Good muscle tone. Normal response to visual stimuli. Normal response to auditory stimuli.  Skin: Normal. No rashes. No lesions noted.               VITAL SIGNS:  Vitals:    03/17/25 1625   BP: 114/70   Pulse: 81   Temp: 97.9 °F (36.6 °C)   TempSrc: Tympanic   SpO2: 98%   Weight: 72.4 kg (159 lb 11.6 oz)   Height: 4' 11" (1.499 m)       CURRENT BMI:   Body mass index is 32.26 kg/m².    LABS REVIEWED:    CBC:  Lab Results   Component Value Date    WBC 8.08 03/08/2024    RBC 4.90 03/08/2024    HGB 14.6 03/08/2024    HCT 45.4 03/08/2024    MCV 93 03/08/2024    MCH 29.8 03/08/2024    MCHC 32.2 03/08/2024    RDW 11.9 03/08/2024     03/08/2024    MPV 11.0 03/08/2024    GRAN 5.6 03/08/2024    GRAN 69.1 03/08/2024    LYMPH 1.8 03/08/2024    LYMPH 22.8 03/08/2024    MONO 0.5 03/08/2024    MONO 6.4 03/08/2024    EOS 0.1 03/08/2024    BASO 0.04 03/08/2024    EOSINOPHIL 0.7 03/08/2024    BASOPHIL 0.5 03/08/2024       CHEMISTRY:  Sodium   Date Value Ref Range Status   12/23/2024 136 136 - 145 mmol/L Final     Potassium   Date Value Ref Range Status   12/23/2024 4.1 3.5 - 5.1 mmol/L Final     Chloride   Date Value Ref Range Status   12/23/2024 103 95 - 110 mmol/L Final     CO2   Date Value Ref Range Status   12/23/2024 24 23 - 29 mmol/L Final     Glucose   Date Value Ref Range Status   12/23/2024 210 (H) 70 - 110 mg/dL Final     BUN   Date Value Ref Range Status   12/23/2024 13 6 - 20 mg/dL Final     Creatinine   Date Value Ref Range Status   12/23/2024 0.8 0.5 - 1.4 mg/dL Final     Calcium   Date Value Ref Range Status   12/23/2024 9.7 8.7 - 10.5 " "mg/dL Final     Total Protein   Date Value Ref Range Status   12/23/2024 8.3 6.0 - 8.4 g/dL Final     Albumin   Date Value Ref Range Status   12/23/2024 4.3 3.5 - 5.2 g/dL Final     Total Bilirubin   Date Value Ref Range Status   12/23/2024 0.6 0.1 - 1.0 mg/dL Final     Comment:     For infants and newborns, interpretation of results should be based  on gestational age, weight and in agreement with clinical  observations.    Premature Infant recommended reference ranges:  Up to 24 hours.............<8.0 mg/dL  Up to 48 hours............<12.0 mg/dL  3-5 days..................<15.0 mg/dL  6-29 days.................<15.0 mg/dL       Alkaline Phosphatase   Date Value Ref Range Status   12/23/2024 111 40 - 150 U/L Final     AST   Date Value Ref Range Status   12/23/2024 44 (H) 10 - 40 U/L Final     ALT   Date Value Ref Range Status   12/23/2024 71 (H) 10 - 44 U/L Final     Anion Gap   Date Value Ref Range Status   12/23/2024 9 8 - 16 mmol/L Final     eGFR   Date Value Ref Range Status   12/23/2024 >60.0 >60 mL/min/1.73 m^2 Final       LIPID PANEL:  Lab Results   Component Value Date    CHOL 169 04/11/2024     Lab Results   Component Value Date    TRIG 258 (H) 04/11/2024     Lab Results   Component Value Date    HDL 38 (L) 04/11/2024     Lab Results   Component Value Date    LDLCALC 79.4 04/11/2024       THYROID:  Lab Results   Component Value Date    TSH 1.854 10/11/2023       DIABETES:  Lab Results   Component Value Date    HGBA1C 8.1 (H) 04/11/2024     No results found for: "MICALBCREAT"    Assessment and Plan     1. Annual physical exam  Comments:  Will obtain CBC, CMP,  TSH, A1c and lipid panel for regular health monitoring  Orders:  -     CBC Auto Differential; Future; Expected date: 03/17/2025  -     Comprehensive Metabolic Panel; Future; Expected date: 03/17/2025  -     Hemoglobin A1C; Future; Expected date: 03/17/2025  -     Lipid Panel; Future; Expected date: 03/17/2025  -     TSH; Future; Expected date: " 03/17/2025    2. Type 2 diabetes mellitus without complication, without long-term current use of insulin  Comments:  Will order Dexcom G7. Will refer to diabetes education. Will obtain A1c  Orders:  -     Hemoglobin A1C; Future; Expected date: 03/17/2025  -     TSH; Future; Expected date: 03/17/2025  -     JANUVIA 50 mg Tab; Take 1 tablet (50 mg total) by mouth every morning.  Dispense: 90 tablet; Refill: 3  -     blood-glucose sensor (DEXCOM G7 SENSOR) Sandra; 1 Device by Misc.(Non-Drug; Combo Route) route every 10 days.  Dispense: 3 each; Refill: 11  -     Ambulatory referral/consult to Diabetes Education; Future; Expected date: 03/24/2025        Assessment & Plan    TYPE 2 DIABETES MANAGEMENT:  - Discussed continuous glucose monitoring (CGM) options for improved blood sugar monitoring.  - Determined Dexcom G7 as the appropriate CGM device based on insurance coverage and smartphone compatibility.  - Explained basic CGM functionality, including sensor placement, 10-day duration, and the need for rotating sensor locations.  - Emphasized the importance of attending the diabetes education appointment for comprehensive CGM training.  - Ordered Dexcom G7 continuous glucose monitoring system.  - Referred the patient to diabetes education for CGM setup and usage training.  - Refilled Januvia, an oral hypoglycemic medication, as requested by the patient.    FOLLOW-UP:  - Instructed the patient to follow up at the scheduled diabetes education appointment for CGM training.               No follow-ups on file.  This note was generated with the assistance of ambient listening technology. Verbal consent was obtained by the patient and accompanying visitor(s) for the recording of patient appointment to facilitate this note. I attest to having reviewed and edited the generated note for accuracy, though some syntax or spelling errors may persist. Please contact the author of this note for any clarification.            [1]   Current  Outpatient Medications:     ibuprofen (ADVIL,MOTRIN) 200 MG tablet, Take 200 mg by mouth every 6 (six) hours as needed for Pain., Disp: , Rfl:     lisdexamfetamine (VYVANSE) 20 MG capsule, Take 1 capsule (20 mg total) by mouth every morning., Disp: 30 capsule, Rfl: 0    risankizumab-rzaa (SKYRIZI) 150 mg/mL PnIj, INJECT 150 MG (1 ML) UNDER THE SKIN ON WEEK 4, THEN EVERY 12 WEEKS THEREAFTER, Disp: 1 mL, Rfl: 4    semaglutide (OZEMPIC) 1 mg/dose (4 mg/3 mL), Inject 1 mg into the skin every 7 days., Disp: 3 mL, Rfl: 11    blood-glucose sensor (DEXCOM G7 SENSOR) Sandra, 1 Device by Misc.(Non-Drug; Combo Route) route every 10 days., Disp: 3 each, Rfl: 11    clindamycin (CLEOCIN T) 1 % Swab, Apply topically 2 (two) times daily. (Patient not taking: Reported on 12/27/2024), Disp: 60 each, Rfl: 2    JANUVIA 50 mg Tab, Take 1 tablet (50 mg total) by mouth every morning., Disp: 90 tablet, Rfl: 3    mometasone (ELOCON) 0.1 % ointment, Apply topically once daily. (Patient not taking: Reported on 9/13/2024), Disp: 45 g, Rfl: 3    resmetirom (REZDIFFRA) 80 mg Tab, Take 80 mg by mouth once daily. (Patient not taking: Reported on 3/17/2025), Disp: 30 tablet, Rfl: 5

## 2025-03-16 ENCOUNTER — RESULTS FOLLOW-UP (OUTPATIENT)
Dept: HEPATOLOGY | Facility: HOSPITAL | Age: 35
End: 2025-03-16

## 2025-03-17 ENCOUNTER — OFFICE VISIT (OUTPATIENT)
Dept: FAMILY MEDICINE | Facility: CLINIC | Age: 35
End: 2025-03-17
Payer: COMMERCIAL

## 2025-03-17 ENCOUNTER — LAB VISIT (OUTPATIENT)
Dept: LAB | Facility: HOSPITAL | Age: 35
End: 2025-03-17
Attending: INTERNAL MEDICINE
Payer: COMMERCIAL

## 2025-03-17 VITALS
WEIGHT: 159.75 LBS | SYSTOLIC BLOOD PRESSURE: 114 MMHG | HEIGHT: 59 IN | DIASTOLIC BLOOD PRESSURE: 70 MMHG | BODY MASS INDEX: 32.2 KG/M2 | OXYGEN SATURATION: 98 % | HEART RATE: 81 BPM | TEMPERATURE: 98 F

## 2025-03-17 DIAGNOSIS — Z00.00 ANNUAL PHYSICAL EXAM: Primary | ICD-10-CM

## 2025-03-17 DIAGNOSIS — Z00.00 ANNUAL PHYSICAL EXAM: ICD-10-CM

## 2025-03-17 DIAGNOSIS — E11.9 TYPE 2 DIABETES MELLITUS WITHOUT COMPLICATION, WITHOUT LONG-TERM CURRENT USE OF INSULIN: ICD-10-CM

## 2025-03-17 DIAGNOSIS — L40.9 PSORIASIS: ICD-10-CM

## 2025-03-17 DIAGNOSIS — K75.81 NASH (NONALCOHOLIC STEATOHEPATITIS): Primary | ICD-10-CM

## 2025-03-17 LAB
BASOPHILS # BLD AUTO: 0.04 K/UL (ref 0–0.2)
BASOPHILS NFR BLD: 0.5 % (ref 0–1.9)
DIFFERENTIAL METHOD BLD: NORMAL
EOSINOPHIL # BLD AUTO: 0.1 K/UL (ref 0–0.5)
EOSINOPHIL NFR BLD: 0.7 % (ref 0–8)
ERYTHROCYTE [DISTWIDTH] IN BLOOD BY AUTOMATED COUNT: 11.9 % (ref 11.5–14.5)
HCT VFR BLD AUTO: 45.1 % (ref 37–48.5)
HGB BLD-MCNC: 14.5 G/DL (ref 12–16)
IMM GRANULOCYTES # BLD AUTO: 0.02 K/UL (ref 0–0.04)
IMM GRANULOCYTES NFR BLD AUTO: 0.3 % (ref 0–0.5)
LYMPHOCYTES # BLD AUTO: 2.1 K/UL (ref 1–4.8)
LYMPHOCYTES NFR BLD: 28.4 % (ref 18–48)
MCH RBC QN AUTO: 29.4 PG (ref 27–31)
MCHC RBC AUTO-ENTMCNC: 32.2 G/DL (ref 32–36)
MCV RBC AUTO: 91 FL (ref 82–98)
MONOCYTES # BLD AUTO: 0.5 K/UL (ref 0.3–1)
MONOCYTES NFR BLD: 6.4 % (ref 4–15)
NEUTROPHILS # BLD AUTO: 4.7 K/UL (ref 1.8–7.7)
NEUTROPHILS NFR BLD: 63.7 % (ref 38–73)
NRBC BLD-RTO: 0 /100 WBC
PLATELET # BLD AUTO: 295 K/UL (ref 150–450)
PMV BLD AUTO: 10.4 FL (ref 9.2–12.9)
RBC # BLD AUTO: 4.94 M/UL (ref 4–5.4)
WBC # BLD AUTO: 7.35 K/UL (ref 3.9–12.7)

## 2025-03-17 PROCEDURE — 99999 PR PBB SHADOW E&M-EST. PATIENT-LVL IV: CPT | Mod: PBBFAC,,, | Performed by: INTERNAL MEDICINE

## 2025-03-17 PROCEDURE — 80053 COMPREHEN METABOLIC PANEL: CPT | Performed by: INTERNAL MEDICINE

## 2025-03-17 PROCEDURE — 86480 TB TEST CELL IMMUN MEASURE: CPT | Performed by: STUDENT IN AN ORGANIZED HEALTH CARE EDUCATION/TRAINING PROGRAM

## 2025-03-17 PROCEDURE — 84443 ASSAY THYROID STIM HORMONE: CPT | Performed by: INTERNAL MEDICINE

## 2025-03-17 PROCEDURE — 80061 LIPID PANEL: CPT | Performed by: INTERNAL MEDICINE

## 2025-03-17 PROCEDURE — 3078F DIAST BP <80 MM HG: CPT | Mod: CPTII,S$GLB,, | Performed by: INTERNAL MEDICINE

## 2025-03-17 PROCEDURE — 3074F SYST BP LT 130 MM HG: CPT | Mod: CPTII,S$GLB,, | Performed by: INTERNAL MEDICINE

## 2025-03-17 PROCEDURE — 83036 HEMOGLOBIN GLYCOSYLATED A1C: CPT | Performed by: INTERNAL MEDICINE

## 2025-03-17 PROCEDURE — 36415 COLL VENOUS BLD VENIPUNCTURE: CPT | Mod: PO | Performed by: INTERNAL MEDICINE

## 2025-03-17 PROCEDURE — 85025 COMPLETE CBC W/AUTO DIFF WBC: CPT | Performed by: INTERNAL MEDICINE

## 2025-03-17 PROCEDURE — 99395 PREV VISIT EST AGE 18-39: CPT | Mod: S$GLB,,, | Performed by: INTERNAL MEDICINE

## 2025-03-17 PROCEDURE — 1159F MED LIST DOCD IN RCRD: CPT | Mod: CPTII,S$GLB,, | Performed by: INTERNAL MEDICINE

## 2025-03-17 PROCEDURE — 3008F BODY MASS INDEX DOCD: CPT | Mod: CPTII,S$GLB,, | Performed by: INTERNAL MEDICINE

## 2025-03-17 RX ORDER — SITAGLIPTIN 50 MG/1
50 TABLET, FILM COATED ORAL EVERY MORNING
Qty: 90 TABLET | Refills: 3 | Status: SHIPPED | OUTPATIENT
Start: 2025-03-17

## 2025-03-17 RX ORDER — BLOOD-GLUCOSE SENSOR
1 EACH MISCELLANEOUS
Qty: 3 EACH | Refills: 11 | Status: SHIPPED | OUTPATIENT
Start: 2025-03-17 | End: 2026-03-17

## 2025-03-17 RX ORDER — INSULIN PUMP SYRINGE, 3 ML
EACH MISCELLANEOUS
Qty: 1 EACH | Refills: 0 | Status: SHIPPED | OUTPATIENT
Start: 2025-03-17 | End: 2026-03-17

## 2025-03-17 RX ORDER — LANCETS
EACH MISCELLANEOUS
Qty: 200 EACH | Refills: 5 | Status: SHIPPED | OUTPATIENT
Start: 2025-03-17

## 2025-03-18 ENCOUNTER — RESULTS FOLLOW-UP (OUTPATIENT)
Dept: FAMILY MEDICINE | Facility: CLINIC | Age: 35
End: 2025-03-18

## 2025-03-18 DIAGNOSIS — E11.9 TYPE 2 DIABETES MELLITUS WITHOUT COMPLICATION, WITHOUT LONG-TERM CURRENT USE OF INSULIN: Primary | ICD-10-CM

## 2025-03-18 LAB
ALBUMIN SERPL BCP-MCNC: 4.3 G/DL (ref 3.5–5.2)
ALP SERPL-CCNC: 102 U/L (ref 40–150)
ALT SERPL W/O P-5'-P-CCNC: 80 U/L (ref 10–44)
ANION GAP SERPL CALC-SCNC: 14 MMOL/L (ref 8–16)
AST SERPL-CCNC: 35 U/L (ref 10–40)
BILIRUB SERPL-MCNC: 0.5 MG/DL (ref 0.1–1)
BUN SERPL-MCNC: 15 MG/DL (ref 6–20)
CALCIUM SERPL-MCNC: 9.8 MG/DL (ref 8.7–10.5)
CHLORIDE SERPL-SCNC: 104 MMOL/L (ref 95–110)
CHOLEST SERPL-MCNC: 156 MG/DL (ref 120–199)
CHOLEST/HDLC SERPL: 3.5 {RATIO} (ref 2–5)
CO2 SERPL-SCNC: 20 MMOL/L (ref 23–29)
CREAT SERPL-MCNC: 0.9 MG/DL (ref 0.5–1.4)
EST. GFR  (NO RACE VARIABLE): >60 ML/MIN/1.73 M^2
ESTIMATED AVG GLUCOSE: 171 MG/DL (ref 68–131)
GLUCOSE SERPL-MCNC: 167 MG/DL (ref 70–110)
HBA1C MFR BLD: 7.6 % (ref 4–5.6)
HDLC SERPL-MCNC: 44 MG/DL (ref 40–75)
HDLC SERPL: 28.2 % (ref 20–50)
LDLC SERPL CALC-MCNC: 76.4 MG/DL (ref 63–159)
NONHDLC SERPL-MCNC: 112 MG/DL
POTASSIUM SERPL-SCNC: 4.3 MMOL/L (ref 3.5–5.1)
PROT SERPL-MCNC: 8.5 G/DL (ref 6–8.4)
SODIUM SERPL-SCNC: 138 MMOL/L (ref 136–145)
TRIGL SERPL-MCNC: 178 MG/DL (ref 30–150)
TSH SERPL DL<=0.005 MIU/L-ACNC: 0.7 UIU/ML (ref 0.4–4)

## 2025-03-18 RX ORDER — SEMAGLUTIDE 2.68 MG/ML
2 INJECTION, SOLUTION SUBCUTANEOUS
Qty: 3 ML | Refills: 11 | Status: SHIPPED | OUTPATIENT
Start: 2025-03-18 | End: 2026-03-18

## 2025-03-19 ENCOUNTER — RESULTS FOLLOW-UP (OUTPATIENT)
Dept: DERMATOLOGY | Facility: CLINIC | Age: 35
End: 2025-03-19

## 2025-03-19 LAB
GAMMA INTERFERON BACKGROUND BLD IA-ACNC: 0.13 IU/ML
M TB IFN-G CD4+ BCKGRND COR BLD-ACNC: -0.01 IU/ML
M TB IFN-G CD4+ BCKGRND COR BLD-ACNC: -0.04 IU/ML
MITOGEN IGNF BCKGRD COR BLD-ACNC: 8.82 IU/ML
TB GOLD PLUS: NEGATIVE

## 2025-03-21 DIAGNOSIS — K75.81 NASH (NONALCOHOLIC STEATOHEPATITIS): ICD-10-CM

## 2025-03-21 RX ORDER — RESMETIROM 80 MG/1
80 TABLET, COATED ORAL DAILY
Qty: 30 TABLET | Refills: 5 | Status: SHIPPED | OUTPATIENT
Start: 2025-03-21

## 2025-04-28 ENCOUNTER — TELEPHONE (OUTPATIENT)
Dept: FAMILY MEDICINE | Facility: CLINIC | Age: 35
End: 2025-04-28
Payer: COMMERCIAL

## 2025-05-21 DIAGNOSIS — E11.9 TYPE 2 DIABETES MELLITUS WITHOUT COMPLICATION: ICD-10-CM

## 2025-06-17 ENCOUNTER — PATIENT OUTREACH (OUTPATIENT)
Dept: ADMINISTRATIVE | Facility: HOSPITAL | Age: 35
End: 2025-06-17
Payer: COMMERCIAL

## 2025-06-20 ENCOUNTER — HOSPITAL ENCOUNTER (OUTPATIENT)
Dept: RADIOLOGY | Facility: HOSPITAL | Age: 35
Discharge: HOME | End: 2025-06-20
Attending: NURSE PRACTITIONER
Payer: COMMERCIAL

## 2025-06-20 ENCOUNTER — RESULTS FOLLOW-UP (OUTPATIENT)
Dept: FAMILY MEDICINE | Facility: CLINIC | Age: 35
End: 2025-06-20

## 2025-06-20 DIAGNOSIS — K74.00 LIVER FIBROSIS: ICD-10-CM

## 2025-06-20 DIAGNOSIS — R93.2 ABNORMAL FINDING ON IMAGING OF LIVER: ICD-10-CM

## 2025-06-20 PROCEDURE — 76700 US EXAM ABDOM COMPLETE: CPT | Mod: 26,,, | Performed by: STUDENT IN AN ORGANIZED HEALTH CARE EDUCATION/TRAINING PROGRAM

## 2025-06-20 PROCEDURE — 76700 US EXAM ABDOM COMPLETE: CPT | Mod: TC

## 2025-08-19 ENCOUNTER — PATIENT MESSAGE (OUTPATIENT)
Dept: ADMINISTRATIVE | Facility: HOSPITAL | Age: 35
End: 2025-08-19
Payer: COMMERCIAL

## 2025-09-02 DIAGNOSIS — K75.81 NASH (NONALCOHOLIC STEATOHEPATITIS): ICD-10-CM

## 2025-09-02 RX ORDER — RESMETIROM 80 MG/1
1 TABLET, COATED ORAL
Qty: 90 TABLET | Refills: 3 | Status: SHIPPED | OUTPATIENT
Start: 2025-09-02